# Patient Record
Sex: MALE | Race: WHITE | NOT HISPANIC OR LATINO | Employment: OTHER | URBAN - METROPOLITAN AREA
[De-identification: names, ages, dates, MRNs, and addresses within clinical notes are randomized per-mention and may not be internally consistent; named-entity substitution may affect disease eponyms.]

---

## 2021-08-25 ENCOUNTER — TRANSFERRED RECORDS (OUTPATIENT)
Dept: HEALTH INFORMATION MANAGEMENT | Facility: CLINIC | Age: 51
End: 2021-08-25

## 2021-08-27 ENCOUNTER — TELEPHONE (OUTPATIENT)
Dept: NEUROLOGY | Facility: CLINIC | Age: 51
End: 2021-08-27

## 2021-08-27 NOTE — TELEPHONE ENCOUNTER
Received URGENT referral  Requesting an appointment next week from [facility].    Please contact GERALD Perkins [phone] with appointment information.    DX:  History of seizures and staring spells.

## 2021-08-27 NOTE — TELEPHONE ENCOUNTER
What is the concern that needs to be addressed by a nurse? Care Coordinator called to schedule urgent new pt, call Mag cleary, at [phone] to schedule appt, then call coordinator back to confirm appt. Please schedule intake appts asap.     May a detailed message be left on voicemail? yes    Date of last office visit: NA    Message routed to: sl[facility] new referrals

## 2021-09-01 NOTE — TELEPHONE ENCOUNTER
RECORDS RECEIVED FROM: External   REASON FOR VISIT: seizures   Date of Appt: 9/22/21   NOTES (FOR ALL VISITS) STATUS DETAILS   OFFICE NOTE from referring provider Care Everywhere SEE INPATIENT NOTES   OFFICE NOTE from other specialist Care Everywhere Dr Abimbola Sierra @ [facility]:  3/9/21  9/15/20  3/4/20  9/4/19  (additional encounters in CE)   DISCHARGE SUMMARY from hospital Care Everywhere [facility]:  8/25/21-8/27/21   DISCHARGE REPORT from the ER Care Everywhere [facility]:  10/4/17   OPERATIVE REPORT N/A    MEDICATION LIST Care Everywhere    IMAGING  (FOR ALL VISITS)     EMG N/A    EEG Care Everywhere [facility]:  8/25/21   LUMBAR PUNCTURE N/A    RAKEL SCAN N/A    ULTRASOUND (CAROTID BILAT) *VASCULAR* N/A    MRI (HEAD, NECK, SPINE) Received [facility]:  MRI Head Brain Venogram 3/16/20  MRA Stroke 3/16/20   CT (HEAD, NECK, SPINE) Received [facility]:  CT Head 8/25/21  CT Head 10/4/17      Action 9/1/21 MV 9.21am   Action Taken Imaging request faxed to [facility] for:  MRI Head Brain Venogram 3/16/20  MRA Stroke 3/16/20  CT Head 8/25/21  CT Head 10/4/17    [facility] images resolved in PACS 9/3/21 MV 11.29am    Pt's chart is marked for merge with MRN [id number]. [facility] records are located in that chart in Care Everywhere

## 2021-09-22 ENCOUNTER — PRE VISIT (OUTPATIENT)
Dept: NEUROLOGY | Facility: CLINIC | Age: 51
End: 2021-09-22

## 2021-09-22 ENCOUNTER — OFFICE VISIT (OUTPATIENT)
Dept: NEUROLOGY | Facility: CLINIC | Age: 51
End: 2021-09-22
Payer: COMMERCIAL

## 2021-09-22 VITALS — HEART RATE: 61 BPM | OXYGEN SATURATION: 93 % | DIASTOLIC BLOOD PRESSURE: 73 MMHG | SYSTOLIC BLOOD PRESSURE: 129 MMHG

## 2021-09-22 DIAGNOSIS — G40.909 RECURRENT SEIZURES (H): Primary | ICD-10-CM

## 2021-09-22 PROCEDURE — 99205 OFFICE O/P NEW HI 60 MIN: CPT | Performed by: PSYCHIATRY & NEUROLOGY

## 2021-09-22 RX ORDER — LATANOPROST 50 UG/ML
1 SOLUTION/ DROPS OPHTHALMIC DAILY
COMMUNITY
Start: 2021-03-09 | End: 2024-03-01

## 2021-09-22 RX ORDER — PHENYTOIN SODIUM 100 MG/1
3 CAPSULE, EXTENDED RELEASE ORAL DAILY
COMMUNITY
Start: 2021-09-08 | End: 2022-05-13

## 2021-09-22 RX ORDER — LEVETIRACETAM 1000 MG/1
1000 TABLET ORAL 2 TIMES DAILY
COMMUNITY
Start: 2021-08-27 | End: 2022-05-13

## 2021-09-22 RX ORDER — MULTIVITAMIN
1 TABLET ORAL DAILY
COMMUNITY

## 2021-09-22 RX ORDER — CARBAMAZEPINE 200 MG/1
3 TABLET ORAL 2 TIMES DAILY
COMMUNITY
Start: 2021-09-08 | End: 2022-05-13

## 2021-09-22 RX ORDER — OLOPATADINE HYDROCHLORIDE 1 MG/ML
1-2 SOLUTION/ DROPS OPHTHALMIC 2 TIMES DAILY
COMMUNITY
Start: 2020-03-04

## 2021-09-22 NOTE — LETTER
"2021       RE: Efrain Ellis  [address] [address] [facility] [facility] [address]     Dear Colleague,    Thank you for referring your patient, Efrain Ellis, to the [facility] [place] at [facility]. Please see a copy of my visit note below.    Service Date: 2021    Abimbola Sierra MD  [facility]  [address] [place]  [address]    RE:  Efrain Ellis  MRN:  [id number]  :  1970    Dear Dr. Sierra:    I had the pleasure of seeing your patient, Mr. Efrain Ellis, at [facility] today.  As you know, this patient is a 50-year-old right-handed male with history of developmental delay and longstanding epilepsy.  He was recently admitted to the hospital because of prolonged altered mental status, which was considered to be prolonged seizure.  The patient is accompanied by his mother in the clinic today.    According to the mother, the patient started to have seizures when he was 7 years old.  Over the years, he had fairly infrequent seizures and he has been taking Tegretol and Dilantin for many years and Keppra was recently added after the hospitalization. The patient is currently taking Tegretol 600 mg b.i.d., Dilantin 300 mg at bedtime and Keppra 1000 mg b.i.d.  The mother stated that the patient is more sleepy after Keppra was added about 2 weeks ago but seems the sleepiness is getting better.    The patient was reported to have 2 types of seizures:      Type 1 is \"petit mal seizure.\"  He will have a behavioral arrest.  He will have staring, which usually lasts for about a minute, then he will be back to his baseline.  This was fairly infrequent over the years, probably less than 1 a year as far as the mother can tell.    Type 2 is generalized tonic-clonic seizures.  The patient had infrequent generalized tonic-clonic seizures over the years.  Mother reported that he probably only had 3-4 of " this type of seizure over his lifetime.    TRIGGERS FOR SEIZURES: Probably flashing light, watching too much TV.    RISK FACTORS FOR SEIZURES:  He had no history of head trauma with loss of consciousness.  No history of CNS infection.  No history of febrile convulsions.  He did have history of developmental delay, but he is independent for his ADLs.    CURRENT MEDICATIONS:  Tegretol 300 mg b.i.d., Dilantin 300 mg at bedtime and Keppra 1000 mg b.i.d.      Current Outpatient Medications   Medication Sig Dispense Refill     carBAMazepine (TEGRETOL) 200 MG tablet Take 3 tablets by mouth 2 times daily       carboxymethylcellulose-glycerin (REFRESH OPTIVE) 0.5-0.9 % ophthalmic solution Place 1 drop into both eyes 6 times daily       cholecalciferol 25 MCG (1000 UT) TABS Take 1,000 Units by mouth daily       latanoprost (XALATAN) 0.005 % ophthalmic solution Apply 1 drop to eye daily       levETIRAcetam (KEPPRA) 1000 MG tablet Take 1,000 mg by mouth 2 times daily       olopatadine (PATANOL) 0.1 % ophthalmic solution Place 1-2 drops into both eyes 2 times daily       phenytoin (DILANTIN) 100 MG capsule Take 3 capsules by mouth daily       Specialty Vitamins Products (VITAMINS FOR HAIR) TABS Take 1 tablet by mouth daily       White Petrolatum-Mineral Oil (WH PETROL-MINERAL OIL-LANOLIN) 0.1-0.1 % OINT Apply 1 strip to eye daily         PAST ANTI-SEIZURE MEDICATIONS:  Depakote.    PAST MEDICAL HISTORY:  Kidney tumor, pulmonary valve disorder, status post repair, ventricular septal defect, status post repair.    PAST SURGICAL HISTORY:  Kidney surgery for renal cancer, back surgery for scoliosis and cervical fusion, pulmonary valve replacement, transcatheter PVR, VSD repair/pulmonary valvotomy.      ALLERGIES:  No known drug allergies.    FAMILY HISTORY:  No family history of seizures.    SOCIAL HISTORY:  He had a history of developmental delay.  He had special education in the past.  He is currently living with his mother.  No  alcohol, no smoking, no drug abuse.  He is independent for his ADLs.    REVIEW OF SYSTEMS:  Positive for fatigue.    PHYSICAL EXAMINATION:    Blood pressure 129/73, pulse 61, SpO2 93 %.    General exam: General Appearance:  No acute distress.  HEENT:  Normocephalic, atraumatic.  Neck:  Supple, no lymphadenopathy. Extremities:  No edema, no clubbing, no cyanosis.      Neurologic Exam:  Alert and oriented x3.  Speech fluent, appropriate. Normal attention.  Cranial Nerves:  Pupils are equal, round, reactive to light and accomodation.  Extraocular movement intact.  No facial weakness or asymmetry.  Facial sensation was normal.  Tongue and palate midline.  Hearing normal.  Visual field : normal to confrontation.   Motor Exam:  Normal bulk.  Normal tone.  Strength 5/5 in all extremities.  Sensory:  Normal to light touch and vibration in all extremities.  Deep tendon reflexes absent in both upper and lower extremities.  Coordination:  Finger-to-nose, heel-to-shin exams showed no ataxia.  Rapid alternating movement was normal.  Gait and Station: Difficulties with walking, antalgic gait due to tendinitis. Not able to do tip-toe or heel walking, tandem walking.      PREVIOUS DIAGNOSTIC TESTING:  EEG on 08/26/2021 showed abnormal due to the burst of irregular spike and slow wave activity or sharp and slow waves with a diffuse distribution at times, more prominent over the right hemisphere and at times more prominent over the anterior regions.  Conclusion is that these appear to be epileptogenic.    CT scan of the head on 08/25/2021 was reported negative.    ASSESSMENT AND PLAN:    1.  Epilepsy.  The patient probably has focal impaired seizures and occasional focal to bilateral tonic-clonic seizures.    Mother reported he has infrequent seizures, probably less than 1 seizure per year.  However, he was recently hospitalized because of presumably prolonged seizures or could be focal impaired seizures with prolonged postictal  state.    The patient is currently taking Tegretol 600 mg b.i.d., Dilantin 300 mg at bedtime and Keppra 1000 mg b.i.d.  The patient has some side effects of fatigue, sleepiness.    The patient seems has not had MRI for many years.  I am not sure if he has ever had an MRI scan of the brain. Will have MRI scan of the brain and outpatient EEG for further evaluation, then we will decide how we can adjust his anti-seizure medications.    2.  Developmental delay.    PLAN:    1.  Continue Tegretol 300 mg b.i.d., Dilantin 300 mg at bedtime, Keppra 1000 mg b.i.d.  2.  MRI scan of the brain. Outpatient EEG for 3 hours.  3.  Return to clinic in 6 weeks.  We will adjust his seizure medications based on the MRI and EEG results.      61 min total time was spent on the day of this visit.      37min was spent on face to face time  8 min was spent on preparation of visit to review charts and labs, ordering medications and tests  16 min was spent on documentation of clinical information      Sincerely,    Lana Jarquin MD

## 2021-09-22 NOTE — PATIENT INSTRUCTIONS
Times of Days am pm        Medication Tablet Size Number of Tablets/Capsules Total Daily Dosage    Keppra 1000 1 1       2000 mg    Tegretol 200 3 3       1200 mg    Dilantin 100   3       300 mg                                                                                               Carry this with you at all times.  CONTINUE TAKING YOUR OTHER MEDICATIONS AS PREVIOUSLY DIRECTED.      * * *Do not store medications in the bathroom.  Keep medications away from children!* * *

## 2021-09-22 NOTE — PROGRESS NOTES
"Service Date: 2021    Abimbola Sierra MD  [facility]  [address] [place]  [address]    RE:  Efrain Ellis  MRN:  [id number]  :  1970    Dear Dr. Sierra:    I had the pleasure of seeing your patient, Mr. Efrain Ellis, at [facility] today.  As you know, this patient is a 50-year-old right-handed male with history of developmental delay and longstanding epilepsy.  He was recently admitted to the hospital because of prolonged altered mental status, which was considered to be prolonged seizure.  The patient is accompanied by his mother in the clinic today.    According to the mother, the patient started to have seizures when he was 7 years old.  Over the years, he had fairly infrequent seizures and he has been taking Tegretol and Dilantin for many years and Keppra was recently added after the hospitalization. The patient is currently taking Tegretol 600 mg b.i.d., Dilantin 300 mg at bedtime and Keppra 1000 mg b.i.d.  The mother stated that the patient is more sleepy after Keppra was added about 2 weeks ago but seems the sleepiness is getting better.    The patient was reported to have 2 types of seizures:      Type 1 is \"petit mal seizure.\"  He will have a behavioral arrest.  He will have staring, which usually lasts for about a minute, then he will be back to his baseline.  This was fairly infrequent over the years, probably less than 1 a year as far as the mother can tell.    Type 2 is generalized tonic-clonic seizures.  The patient had infrequent generalized tonic-clonic seizures over the years.  Mother reported that he probably only had 3-4 of this type of seizure over his lifetime.    TRIGGERS FOR SEIZURES: Probably flashing light, watching too much TV.    RISK FACTORS FOR SEIZURES:  He had no history of head trauma with loss of consciousness.  No history of CNS infection.  No history of febrile convulsions.  He did have history of developmental delay, but he " is independent for his ADLs.    CURRENT MEDICATIONS:  Tegretol 300 mg b.i.d., Dilantin 300 mg at bedtime and Keppra 1000 mg b.i.d.      Current Outpatient Medications   Medication Sig Dispense Refill     carBAMazepine (TEGRETOL) 200 MG tablet Take 3 tablets by mouth 2 times daily       carboxymethylcellulose-glycerin (REFRESH OPTIVE) 0.5-0.9 % ophthalmic solution Place 1 drop into both eyes 6 times daily       cholecalciferol 25 MCG (1000 UT) TABS Take 1,000 Units by mouth daily       latanoprost (XALATAN) 0.005 % ophthalmic solution Apply 1 drop to eye daily       levETIRAcetam (KEPPRA) 1000 MG tablet Take 1,000 mg by mouth 2 times daily       olopatadine (PATANOL) 0.1 % ophthalmic solution Place 1-2 drops into both eyes 2 times daily       phenytoin (DILANTIN) 100 MG capsule Take 3 capsules by mouth daily       Specialty Vitamins Products (VITAMINS FOR HAIR) TABS Take 1 tablet by mouth daily       White Petrolatum-Mineral Oil (WH PETROL-MINERAL OIL-LANOLIN) 0.1-0.1 % OINT Apply 1 strip to eye daily         PAST ANTI-SEIZURE MEDICATIONS:  Depakote.    PAST MEDICAL HISTORY:  Kidney tumor, pulmonary valve disorder, status post repair, ventricular septal defect, status post repair.    PAST SURGICAL HISTORY:  Kidney surgery for renal cancer, back surgery for scoliosis and cervical fusion, pulmonary valve replacement, transcatheter PVR, VSD repair/pulmonary valvotomy.      ALLERGIES:  No known drug allergies.    FAMILY HISTORY:  No family history of seizures.    SOCIAL HISTORY:  He had a history of developmental delay.  He had special education in the past.  He is currently living with his mother.  No alcohol, no smoking, no drug abuse.  He is independent for his ADLs.    REVIEW OF SYSTEMS:  Positive for fatigue.    PHYSICAL EXAMINATION:    Blood pressure 129/73, pulse 61, SpO2 93 %.    General exam: General Appearance:  No acute distress.  HEENT:  Normocephalic, atraumatic.  Neck:  Supple, no lymphadenopathy.  Extremities:  No edema, no clubbing, no cyanosis.      Neurologic Exam:  Alert and oriented x3.  Speech fluent, appropriate. Normal attention.  Cranial Nerves:  Pupils are equal, round, reactive to light and accomodation.  Extraocular movement intact.  No facial weakness or asymmetry.  Facial sensation was normal.  Tongue and palate midline.  Hearing normal.  Visual field : normal to confrontation.   Motor Exam:  Normal bulk.  Normal tone.  Strength 5/5 in all extremities.  Sensory:  Normal to light touch and vibration in all extremities.  Deep tendon reflexes absent in both upper and lower extremities.  Coordination:  Finger-to-nose, heel-to-shin exams showed no ataxia.  Rapid alternating movement was normal.  Gait and Station: Difficulties with walking, antalgic gait due to tendinitis. Not able to do tip-toe or heel walking, tandem walking.      PREVIOUS DIAGNOSTIC TESTING:  EEG on 08/26/2021 showed abnormal due to the burst of irregular spike and slow wave activity or sharp and slow waves with a diffuse distribution at times, more prominent over the right hemisphere and at times more prominent over the anterior regions.  Conclusion is that these appear to be epileptogenic.    CT scan of the head on 08/25/2021 was reported negative.    ASSESSMENT AND PLAN:    1.  Epilepsy.  The patient probably has focal impaired seizures and occasional focal to bilateral tonic-clonic seizures.    Mother reported he has infrequent seizures, probably less than 1 seizure per year.  However, he was recently hospitalized because of presumably prolonged seizures or could be focal impaired seizures with prolonged postictal state.    The patient is currently taking Tegretol 600 mg b.i.d., Dilantin 300 mg at bedtime and Keppra 1000 mg b.i.d.  The patient has some side effects of fatigue, sleepiness.    The patient seems has not had MRI for many years.  I am not sure if he has ever had an MRI scan of the brain. Will have MRI scan of the  brain and outpatient EEG for further evaluation, then we will decide how we can adjust his anti-seizure medications.    2.  Developmental delay.    PLAN:    1.  Continue Tegretol 300 mg b.i.d., Dilantin 300 mg at bedtime, Keppra 1000 mg b.i.d.  2.  MRI scan of the brain. Outpatient EEG for 3 hours.  3.  Return to clinic in 6 weeks.  We will adjust his seizure medications based on the MRI and EEG results.      61 min total time was spent on the day of this visit.      37min was spent on face to face time  8 min was spent on preparation of visit to review charts and labs, ordering medications and tests  16 min was spent on documentation of clinical information      Sincerely,    Lana Jarquin MD        D: 2021   T: 2021   MT: christine    Name:     SEVERIANO DILLBrain  MRN:      [id number]        Account:      [id number]   :      1970           Service Date: 2021       Document: [id number]

## 2021-10-21 ENCOUNTER — ANCILLARY PROCEDURE (OUTPATIENT)
Dept: NEUROLOGY | Facility: CLINIC | Age: 51
End: 2021-10-21
Attending: PSYCHIATRY & NEUROLOGY
Payer: COMMERCIAL

## 2021-10-22 DIAGNOSIS — G40.909 RECURRENT SEIZURES (H): Primary | ICD-10-CM

## 2021-10-22 NOTE — TELEPHONE ENCOUNTER
Patient's mother was contacted to discuss her concern about the MRI and patient's claustrophobia. She tells me that she can't tell me much more information about his previous tolerance of MRI but that she doubts he could endure an MRI at this time. She is not aware if he has used oral or IV sedation for imaging in the past.     Will request an order for med from provider. Scan is scheduled for this Sunday.

## 2021-10-22 NOTE — TELEPHONE ENCOUNTER
[facility] Call Center    Phone Message    May a detailed message be left on voicemail: yes     Reason for Call: Other: Patient mother is requesting a call back to discuss patient MRI appoinment and patient being claustrophobic , please call Mag at [phone] to advise.     Action Taken: Message routed to:  [facility] ([facility]    Travel Screening: Not Applicable

## 2021-11-01 RX ORDER — DIAZEPAM 10 MG
10 TABLET ORAL ONCE
Qty: 1 TABLET | Refills: 0 | Status: SHIPPED | OUTPATIENT
Start: 2021-11-01 | End: 2021-11-01

## 2021-11-03 ENCOUNTER — OFFICE VISIT (OUTPATIENT)
Dept: NEUROLOGY | Facility: CLINIC | Age: 51
End: 2021-11-03
Payer: COMMERCIAL

## 2021-11-03 VITALS — OXYGEN SATURATION: 94 % | SYSTOLIC BLOOD PRESSURE: 125 MMHG | HEART RATE: 69 BPM | DIASTOLIC BLOOD PRESSURE: 74 MMHG

## 2021-11-03 DIAGNOSIS — T59.891A ENCEPHALOPATHY DUE TO AMMONIA: Primary | ICD-10-CM

## 2021-11-03 DIAGNOSIS — G92.8 ENCEPHALOPATHY DUE TO AMMONIA: Primary | ICD-10-CM

## 2021-11-03 PROCEDURE — 99214 OFFICE O/P EST MOD 30 MIN: CPT | Performed by: PSYCHIATRY & NEUROLOGY

## 2021-11-03 RX ORDER — LEVOCARNITINE 330 MG/1
330 TABLET ORAL 3 TIMES DAILY
Qty: 90 TABLET | Refills: 6 | Status: SHIPPED | OUTPATIENT
Start: 2021-11-03 | End: 2024-03-01

## 2021-11-03 NOTE — PROGRESS NOTES
"CC: Follow up for seizures.     HPI:  In person follow up.  This patient is a 50-year-old right-handed male with history of developmental delay and longstanding epilepsy.  He was recently admitted to the hospital in Aug. 2021 because of prolonged altered mental status, which was considered to be prolonged seizure.  The patient is accompanied by his mother in the clinic today. Since the last visit, he had no seizures. He was not able to do the MRI because of claustrophobia. He was given a prescription of Valium 10 mg.  Now his MRI is scheduled on 11/16/2021.  is currently taking Tegretol 600 mg b.i.d., Dilantin 300 mg at bedtime and Keppra 1000 mg b.i.d.  The patient has some side effects of fatigue, sleepiness. He is compliant with the meds, complains of some fatigue, sleepiness.    According to the mother, the patient started to have seizures when he was 7 years old.  Over the years, he had fairly infrequent seizures and he has been taking Tegretol and Dilantin for many years and Keppra was recently added after the hospitalization. The patient is currently taking Tegretol 600 mg b.i.d., Dilantin 300 mg at bedtime and Keppra 1000 mg b.i.d.  The mother stated that the patient is more sleepy after Keppra was added about 2 weeks ago but seems the sleepiness is getting better.    The patient was reported to have 2 types of seizures:      Type 1 is \"petit mal seizure.\"  He will have a behavioral arrest.  He will have staring, which usually lasts for about a minute, then he will be back to his baseline.  This was fairly infrequent over the years, probably less than 1 a year as far as the mother can tell.    Type 2 is generalized tonic-clonic seizures.  The patient had infrequent generalized tonic-clonic seizures over the years.  Mother reported that he probably only had 3-4 of this type of seizure over his lifetime.    TRIGGERS FOR SEIZURES: Probably flashing light, watching too much TV.    RISK FACTORS FOR SEIZURES:  He " had no history of head trauma with loss of consciousness.  No history of CNS infection.  No history of febrile convulsions.  He did have history of developmental delay, but he is independent for his ADLs.    CURRENT MEDICATIONS:  Tegretol 300 mg b.i.d., Dilantin 300 mg at bedtime and Keppra 1000 mg b.i.d.      Current Outpatient Medications   Medication Sig Dispense Refill     carBAMazepine (TEGRETOL) 200 MG tablet Take 3 tablets by mouth 2 times daily       carboxymethylcellulose-glycerin (REFRESH OPTIVE) 0.5-0.9 % ophthalmic solution Place 1 drop into both eyes 6 times daily       cholecalciferol 25 MCG (1000 UT) TABS Take 1,000 Units by mouth daily       latanoprost (XALATAN) 0.005 % ophthalmic solution Apply 1 drop to eye daily       levETIRAcetam (KEPPRA) 1000 MG tablet Take 1,000 mg by mouth 2 times daily       olopatadine (PATANOL) 0.1 % ophthalmic solution Place 1-2 drops into both eyes 2 times daily       phenytoin (DILANTIN) 100 MG capsule Take 3 capsules by mouth daily       Specialty Vitamins Products (VITAMINS FOR HAIR) TABS Take 1 tablet by mouth daily       White Petrolatum-Mineral Oil (WH PETROL-MINERAL OIL-LANOLIN) 0.1-0.1 % OINT Apply 1 strip to eye daily         PAST ANTI-SEIZURE MEDICATIONS:  Depakote.    PAST MEDICAL HISTORY:  Kidney tumor, pulmonary valve disorder, status post repair, ventricular septal defect, status post repair.    PAST SURGICAL HISTORY:  Kidney surgery for renal cancer, back surgery for scoliosis and cervical fusion, pulmonary valve replacement, transcatheter PVR, VSD repair/pulmonary valvotomy.      ALLERGIES:  No known drug allergies.    FAMILY HISTORY:  No family history of seizures.    SOCIAL HISTORY:  He had a history of developmental delay.  He had special education in the past.  He is currently living with his mother.  No alcohol, no smoking, no drug abuse.  He is independent for his ADLs.    REVIEW OF SYSTEMS:  Positive for fatigue.    PHYSICAL EXAMINATION:    Blood  pressure 125/74, pulse 69, SpO2 94 %.    General exam: General Appearance: No acute distress. HEENT: Normocephalic, atraumatic. Neck: Supple.  Extremities: No edema, no clubbing, no cyanosis.     Neurologic Exam: Alert and oriented x3. Speech fluent, appropriate. Normal attention. Cranial Nerves: Pupils are equal, round, reactive to light and accomodation. Extraocular movement intact. No facial weakness or asymmetry. Hearing normal. Motor Exam: Normal. Coordination:no ataxia.  Gait and Station: normal.    PREVIOUS DIAGNOSTIC TESTING:  EEG on 08/26/2021 showed abnormal due to the burst of irregular spike and slow wave activity or sharp and slow waves with a diffuse distribution at times, more prominent over the right hemisphere and at times more prominent over the anterior regions.  Conclusion is that these appear to be epileptogenic.    CT scan of the head on 08/25/2021 was reported negative.    ASSESSMENT AND PLAN:    1.  Epilepsy.  The patient probably has focal impaired seizures and occasional focal to bilateral tonic-clonic seizures.    Since the last visit, he had no seizures. He was not able to do the MRI because of claustrophobia. He was given a prescription of Valium 10 mg.  Now his MRI is scheduled on 11/16/2021.  is currently taking Tegretol 600 mg b.i.d., Dilantin 300 mg at bedtime and Keppra 1000 mg b.i.d.  The patient has some side effects of fatigue, sleepiness. He is compliant with the meds, complains of some fatigue, sleepiness.    2.  Developmental delay.    PLAN:    1.  Continue Tegretol 300 mg b.i.d., Dilantin 300 mg at bedtime, Keppra 1000 mg b.i.d.  2.  MRI scan of the brain. Valium 10 mg 30 min before MRI. Prescription given.  3.  Return to clinic in 3 months.  We will adjust his seizure medications based on the MRI and EEG results.      37 min total time was spent on the day of this visit.      25 min was spent on face to face time  7 min was spent on preparation of visit to review charts and  labs, ordering medications and tests  5 min was spent on documentation of clinical information

## 2021-11-03 NOTE — LETTER
"11/3/2021       RE: Efrain Ellis  [id number] [facility] [address]     Dear Colleague,    Thank you for referring your patient, Efrain Ellis, to the [facility] NEUROLOGY CLINIC [place] at [facility]. Please see a copy of my visit note below.    CC: Follow up for seizures.     HPI:  In person follow up.  This patient is a 50-year-old right-handed male with history of developmental delay and longstanding epilepsy.  He was recently admitted to the hospital in Aug. 2021 because of prolonged altered mental status, which was considered to be prolonged seizure.  The patient is accompanied by his mother in the clinic today. Since the last visit, he had no seizures. He was not able to do the MRI because of claustrophobia. He was given a prescription of Valium 10 mg.  Now his MRI is scheduled on 11/16/2021.  is currently taking Tegretol 600 mg b.i.d., Dilantin 300 mg at bedtime and Keppra 1000 mg b.i.d.  The patient has some side effects of fatigue, sleepiness. He is compliant with the meds, complains of some fatigue, sleepiness.    According to the mother, the patient started to have seizures when he was 7 years old.  Over the years, he had fairly infrequent seizures and he has been taking Tegretol and Dilantin for many years and Keppra was recently added after the hospitalization. The patient is currently taking Tegretol 600 mg b.i.d., Dilantin 300 mg at bedtime and Keppra 1000 mg b.i.d.  The mother stated that the patient is more sleepy after Keppra was added about 2 weeks ago but seems the sleepiness is getting better.    The patient was reported to have 2 types of seizures:      Type 1 is \"petit mal seizure.\"  He will have a behavioral arrest.  He will have staring, which usually lasts for about a minute, then he will be back to his baseline.  This was fairly infrequent over the years, probably less than 1 a year as far as the mother can " tell.    Type 2 is generalized tonic-clonic seizures.  The patient had infrequent generalized tonic-clonic seizures over the years.  Mother reported that he probably only had 3-4 of this type of seizure over his lifetime.    TRIGGERS FOR SEIZURES: Probably flashing light, watching too much TV.    RISK FACTORS FOR SEIZURES:  He had no history of head trauma with loss of consciousness.  No history of CNS infection.  No history of febrile convulsions.  He did have history of developmental delay, but he is independent for his ADLs.    CURRENT MEDICATIONS:  Tegretol 300 mg b.i.d., Dilantin 300 mg at bedtime and Keppra 1000 mg b.i.d.      Current Outpatient Medications   Medication Sig Dispense Refill     carBAMazepine (TEGRETOL) 200 MG tablet Take 3 tablets by mouth 2 times daily       carboxymethylcellulose-glycerin (REFRESH OPTIVE) 0.5-0.9 % ophthalmic solution Place 1 drop into both eyes 6 times daily       cholecalciferol 25 MCG (1000 UT) TABS Take 1,000 Units by mouth daily       latanoprost (XALATAN) 0.005 % ophthalmic solution Apply 1 drop to eye daily       levETIRAcetam (KEPPRA) 1000 MG tablet Take 1,000 mg by mouth 2 times daily       olopatadine (PATANOL) 0.1 % ophthalmic solution Place 1-2 drops into both eyes 2 times daily       phenytoin (DILANTIN) 100 MG capsule Take 3 capsules by mouth daily       Specialty Vitamins Products (VITAMINS FOR HAIR) TABS Take 1 tablet by mouth daily       White Petrolatum-Mineral Oil (WH PETROL-MINERAL OIL-LANOLIN) 0.1-0.1 % OINT Apply 1 strip to eye daily         PAST ANTI-SEIZURE MEDICATIONS:  Depakote.    PAST MEDICAL HISTORY:  Kidney tumor, pulmonary valve disorder, status post repair, ventricular septal defect, status post repair.    PAST SURGICAL HISTORY:  Kidney surgery for renal cancer, back surgery for scoliosis and cervical fusion, pulmonary valve replacement, transcatheter PVR, VSD repair/pulmonary valvotomy.      ALLERGIES:  No known drug allergies.    FAMILY  HISTORY:  No family history of seizures.    SOCIAL HISTORY:  He had a history of developmental delay.  He had special education in the past.  He is currently living with his mother.  No alcohol, no smoking, no drug abuse.  He is independent for his ADLs.    REVIEW OF SYSTEMS:  Positive for fatigue.    PHYSICAL EXAMINATION:    Blood pressure 125/74, pulse 69, SpO2 94 %.    General exam: General Appearance: No acute distress. HEENT: Normocephalic, atraumatic. Neck: Supple.  Extremities: No edema, no clubbing, no cyanosis.     Neurologic Exam: Alert and oriented x3. Speech fluent, appropriate. Normal attention. Cranial Nerves: Pupils are equal, round, reactive to light and accomodation. Extraocular movement intact. No facial weakness or asymmetry. Hearing normal. Motor Exam: Normal. Coordination:no ataxia.  Gait and Station: normal.    PREVIOUS DIAGNOSTIC TESTING:  EEG on 08/26/2021 showed abnormal due to the burst of irregular spike and slow wave activity or sharp and slow waves with a diffuse distribution at times, more prominent over the right hemisphere and at times more prominent over the anterior regions.  Conclusion is that these appear to be epileptogenic.    CT scan of the head on 08/25/2021 was reported negative.    ASSESSMENT AND PLAN:    1.  Epilepsy.  The patient probably has focal impaired seizures and occasional focal to bilateral tonic-clonic seizures.    Since the last visit, he had no seizures. He was not able to do the MRI because of claustrophobia. He was given a prescription of Valium 10 mg.  Now his MRI is scheduled on 11/16/2021.  is currently taking Tegretol 600 mg b.i.d., Dilantin 300 mg at bedtime and Keppra 1000 mg b.i.d.  The patient has some side effects of fatigue, sleepiness. He is compliant with the meds, complains of some fatigue, sleepiness.    2.  Developmental delay.    PLAN:    1.  Continue Tegretol 300 mg b.i.d., Dilantin 300 mg at bedtime, Keppra 1000 mg b.i.d.  2.  MRI scan of  the brain. Valium 10 mg 30 min before MRI. Prescription given.  3.  Return to clinic in 3 months.  We will adjust his seizure medications based on the MRI and EEG results.      37 min total time was spent on the day of this visit.      25 min was spent on face to face time  7 min was spent on preparation of visit to review charts and labs, ordering medications and tests  5 min was spent on documentation of clinical information      Again, thank you for allowing me to participate in the care of your patient.      Sincerely,    Lana Jarquin MD

## 2021-11-16 ENCOUNTER — ANCILLARY PROCEDURE (OUTPATIENT)
Dept: MRI IMAGING | Facility: CLINIC | Age: 51
End: 2021-11-16
Attending: PSYCHIATRY & NEUROLOGY
Payer: COMMERCIAL

## 2021-11-16 PROCEDURE — 70553 MRI BRAIN STEM W/O & W/DYE: CPT | Performed by: RADIOLOGY

## 2021-11-16 PROCEDURE — A9585 GADOBUTROL INJECTION: HCPCS | Performed by: RADIOLOGY

## 2021-11-16 RX ORDER — GADOBUTROL 604.72 MG/ML
15 INJECTION INTRAVENOUS ONCE
Status: COMPLETED | OUTPATIENT
Start: 2021-11-16 | End: 2021-11-16

## 2021-11-16 RX ADMIN — GADOBUTROL 12 ML: 604.72 INJECTION INTRAVENOUS at 09:31

## 2021-11-16 NOTE — DISCHARGE INSTRUCTIONS
MRI Contrast Discharge Instructions    The IV contrast you received today will pass out of your body in your  urine. This will happen in the next 24 hours. You will not feel this process.  Your urine will not change color.    Drink at least 4 extra glasses of water or juice today (unless your doctor  has restricted your fluids). This reduces the stress on your kidneys.  You may take your regular medicines.    If you are on dialysis: It is best to have dialysis today.    If you have a reaction: Most reactions happen right away. If you have  any new symptoms after leaving the hospital (such as hives or swelling),  call your hospital at the correct number below. Or call your family doctor.  If you have breathing distress or wheezing, call 911.    Special instructions: ***    I have read and understand the above information.    Signature:______________________________________ Date:___________    Staff:__________________________________________ Date:___________     Time:__________    [place] [facility] Departments:    ___[place]: [phone]  ___[facility]: [phone]  ___[place]: [phone] ___[facility]: [phone]  ___[facility]: [phone]  ___[facility]: [phone]  ___[phone]  ___[facility]: [phone]  ___[phone]

## 2022-02-04 ENCOUNTER — OFFICE VISIT (OUTPATIENT)
Dept: NEUROLOGY | Facility: CLINIC | Age: 52
End: 2022-02-04
Payer: COMMERCIAL

## 2022-02-04 ENCOUNTER — LAB (OUTPATIENT)
Dept: LAB | Facility: CLINIC | Age: 52
End: 2022-02-04
Attending: PSYCHIATRY & NEUROLOGY

## 2022-02-04 VITALS — OXYGEN SATURATION: 97 % | SYSTOLIC BLOOD PRESSURE: 131 MMHG | DIASTOLIC BLOOD PRESSURE: 72 MMHG | HEART RATE: 59 BPM

## 2022-02-04 DIAGNOSIS — G40.909 RECURRENT SEIZURES (H): ICD-10-CM

## 2022-02-04 DIAGNOSIS — G40.909 RECURRENT SEIZURES (H): Primary | ICD-10-CM

## 2022-02-04 LAB — PHENYTOIN SERPL-MCNC: 18.5 MG/L

## 2022-02-04 PROCEDURE — 99214 OFFICE O/P EST MOD 30 MIN: CPT | Performed by: PSYCHIATRY & NEUROLOGY

## 2022-02-04 PROCEDURE — 80177 DRUG SCRN QUAN LEVETIRACETAM: CPT | Mod: 90 | Performed by: PATHOLOGY

## 2022-02-04 PROCEDURE — 80161 ASY CARBAMAZEPIN 10,11-EPXID: CPT | Mod: 90 | Performed by: PATHOLOGY

## 2022-02-04 PROCEDURE — 80156 ASSAY CARBAMAZEPINE TOTAL: CPT | Mod: 90 | Performed by: PATHOLOGY

## 2022-02-04 PROCEDURE — 80157 ASSAY CARBAMAZEPINE FREE: CPT | Mod: 90 | Performed by: PATHOLOGY

## 2022-02-04 PROCEDURE — 99000 SPECIMEN HANDLING OFFICE-LAB: CPT | Performed by: PATHOLOGY

## 2022-02-04 PROCEDURE — 80185 ASSAY OF PHENYTOIN TOTAL: CPT | Mod: 90 | Performed by: PATHOLOGY

## 2022-02-04 PROCEDURE — 80186 ASSAY OF PHENYTOIN FREE: CPT | Mod: 90 | Performed by: PATHOLOGY

## 2022-02-04 PROCEDURE — 36415 COLL VENOUS BLD VENIPUNCTURE: CPT | Performed by: PATHOLOGY

## 2022-02-04 RX ORDER — CLOBETASOL PROPIONATE 0.5 MG/G
1 CREAM TOPICAL 2 TIMES DAILY
COMMUNITY
Start: 2022-01-05

## 2022-02-04 NOTE — PROGRESS NOTES
"CC: Follow up for seizures.     HPI:  In person follow up.  This patient is a 51-year-old right-handed male with history of developmental delay and longstanding epilepsy. He is accompanied by his mother in the clinic today. Since the last visit, he had no seizures. His last seizure was in Aug. 2021, when he was admitted to the hospital. He had MRi in Nov. 2021, which was normal. He is currently taking Tegretol 600 mg b.i.d., Dilantin 360 mg at bedtime and Keppra 1000 mg b.i.d.  The patient has some side effects of fatigue, sleepiness. Motehr is wondering if he need this much meds, can he take less meds? He is compliant with the meds, complains of some fatigue, sleepiness.    He was recently admitted to the hospital in Aug. 2021 because of prolonged altered mental status, which was considered to be prolonged seizure.      According to the mother, the patient started to have seizures when he was 7 years old.  Over the years, he had fairly infrequent seizures and he has been taking Tegretol and Dilantin for many years and Keppra was recently added after the hospitalization. The patient is currently taking Tegretol 600 mg b.i.d., Dilantin 300 mg at bedtime and Keppra 1000 mg b.i.d.  The mother stated that the patient is more sleepy after Keppra was added about 2 weeks ago but seems the sleepiness is getting better.    The patient was reported to have 2 types of seizures:      Type 1 is \"petit mal seizure.\"  He will have a behavioral arrest.  He will have staring, which usually lasts for about a minute, then he will be back to his baseline.  This was fairly infrequent over the years, probably less than 1 a year as far as the mother can tell.    Type 2 is generalized tonic-clonic seizures.  The patient had infrequent generalized tonic-clonic seizures over the years.  Mother reported that he probably only had 3-4 of this type of seizure over his lifetime.    TRIGGERS FOR SEIZURES: Probably flashing light, watching too much " TV.    RISK FACTORS FOR SEIZURES:  He had no history of head trauma with loss of consciousness.  No history of CNS infection.  No history of febrile convulsions.  He did have history of developmental delay, but he is independent for his ADLs.    CURRENT MEDICATIONS:  Tegretol 600 mg b.i.d., Dilantin 360 mg at bedtime and Keppra 1000 mg b.i.d.      Current Outpatient Medications   Medication Sig Dispense Refill     carBAMazepine (TEGRETOL) 200 MG tablet Take 3 tablets by mouth 2 times daily       carboxymethylcellulose-glycerin (REFRESH OPTIVE) 0.5-0.9 % ophthalmic solution Place 1 drop into both eyes 6 times daily       cholecalciferol 25 MCG (1000 UT) TABS Take 1,000 Units by mouth daily       clobetasol (TEMOVATE) 0.05 % external cream Apply 1 Application topically 2 times daily       latanoprost (XALATAN) 0.005 % ophthalmic solution Apply 1 drop to eye daily       levETIRAcetam (KEPPRA) 1000 MG tablet Take 1,000 mg by mouth 2 times daily       levOCARNitine (CARNITOR) 330 MG tablet Take 1 tablet (330 mg) by mouth 3 times daily 90 tablet 6     olopatadine (PATANOL) 0.1 % ophthalmic solution Place 1-2 drops into both eyes 2 times daily       phenytoin (DILANTIN) 100 MG capsule Take 3 capsules by mouth daily       Specialty Vitamins Products (VITAMINS FOR HAIR) TABS Take 1 tablet by mouth daily       White Petrolatum-Mineral Oil (WH PETROL-MINERAL OIL-LANOLIN) 0.1-0.1 % OINT Apply 1 strip to eye daily         PAST ANTI-SEIZURE MEDICATIONS:  Depakote.    PAST MEDICAL HISTORY:  Kidney tumor, pulmonary valve disorder, status post repair, ventricular septal defect, status post repair.    PAST SURGICAL HISTORY:  Kidney surgery for renal cancer, back surgery for scoliosis and cervical fusion, pulmonary valve replacement, transcatheter PVR, VSD repair/pulmonary valvotomy.      ALLERGIES:  No known drug allergies.    FAMILY HISTORY:  No family history of seizures.    SOCIAL HISTORY:  He had a history of developmental delay.   He had special education in the past.  He is currently living with his mother.  No alcohol, no smoking, no drug abuse.  He is independent for his ADLs.    REVIEW OF SYSTEMS:  Positive for fatigue.    PHYSICAL EXAMINATION:    Blood pressure 131/72, pulse 59, SpO2 97 %.    General exam: General Appearance: No acute distress. HEENT: Normocephalic, atraumatic. Neck: Supple.  Extremities: No edema, no clubbing, no cyanosis.     Neurologic Exam: Alert and oriented x3. Speech fluent, appropriate. Normal attention. Cranial Nerves: Pupils are equal, round, reactive to light and accomodation. Extraocular movement intact. No facial weakness or asymmetry. Hearing normal. Motor Exam: Normal. Coordination:no ataxia.  Gait and Station: normal.    PREVIOUS DIAGNOSTIC TESTING:  EEG on 08/26/2021 showed abnormal due to the burst of irregular spike and slow wave activity or sharp and slow waves with a diffuse distribution at times, more prominent over the right hemisphere and at times more prominent over the anterior regions.  Conclusion is that these appear to be epileptogenic.    CT scan of the head on 08/25/2021 was reported negative.    ASSESSMENT AND PLAN:    1.  Epilepsy.  The patient probably has focal impaired seizures and occasional focal to bilateral tonic-clonic seizures.    Since the last visit, he had no seizures. His last seizure was in Aug. 2021, when he was admitted to the hospital. He had MRi in Nov. 2021, which was normal. He is currently taking Tegretol 600 mg b.i.d., Dilantin 360 mg at bedtime and Keppra 1000 mg b.i.d.  The patient has some side effects of fatigue, sleepiness. Collin is wondering if he need this much meds.    2.  Developmental delay.    PLAN:    1.  Continue Tegretol 600 mg b.i.d., Dilantin 360 mg at bedtime, Keppra 1000 mg b.i.d.  2.  Patient and his mother were advised to bring the pill bottles during the next visit.   3.  Return to clinic in 3 months.  We will adjust his meds based on his lab.   Patient was advised to have more exercise and eat healthy. Goal is to lose 10 lb for the next 3 months.      32 min total time was spent on the day of this visit.      22 min was spent on face to face time  5 min was spent on preparation of visit to review charts and labs, ordering medications and tests  5 min was spent on documentation of clinical information

## 2022-02-04 NOTE — LETTER
"2/4/2022       RE: Efrain Ellis  [address] [facility] [address]     Dear Colleague,    Thank you for referring your patient, Efrain Ellis, to the [facility] NEUROLOGY CLINIC [place] at [facility]. Please see a copy of my visit note below.    CC: Follow up for seizures.     HPI:  In person follow up.  This patient is a 51-year-old right-handed male with history of developmental delay and longstanding epilepsy. He is accompanied by his mother in the clinic today. Since the last visit, he had no seizures. His last seizure was in Aug. 2021, when he was admitted to the hospital. He had MRi in Nov. 2021, which was normal. He is currently taking Tegretol 600 mg b.i.d., Dilantin 360 mg at bedtime and Keppra 1000 mg b.i.d.  The patient has some side effects of fatigue, sleepiness. Motehr is wondering if he need this much meds, can he take less meds? He is compliant with the meds, complains of some fatigue, sleepiness.    He was recently admitted to the hospital in Aug. 2021 because of prolonged altered mental status, which was considered to be prolonged seizure.      According to the mother, the patient started to have seizures when he was 7 years old.  Over the years, he had fairly infrequent seizures and he has been taking Tegretol and Dilantin for many years and Keppra was recently added after the hospitalization. The patient is currently taking Tegretol 600 mg b.i.d., Dilantin 300 mg at bedtime and Keppra 1000 mg b.i.d.  The mother stated that the patient is more sleepy after Keppra was added about 2 weeks ago but seems the sleepiness is getting better.    The patient was reported to have 2 types of seizures:      Type 1 is \"petit mal seizure.\"  He will have a behavioral arrest.  He will have staring, which usually lasts for about a minute, then he will be back to his baseline.  This was fairly infrequent over the years, probably less than 1 " a year as far as the mother can tell.    Type 2 is generalized tonic-clonic seizures.  The patient had infrequent generalized tonic-clonic seizures over the years.  Mother reported that he probably only had 3-4 of this type of seizure over his lifetime.    TRIGGERS FOR SEIZURES: Probably flashing light, watching too much TV.    RISK FACTORS FOR SEIZURES:  He had no history of head trauma with loss of consciousness.  No history of CNS infection.  No history of febrile convulsions.  He did have history of developmental delay, but he is independent for his ADLs.    CURRENT MEDICATIONS:  Tegretol 600 mg b.i.d., Dilantin 360 mg at bedtime and Keppra 1000 mg b.i.d.      Current Outpatient Medications   Medication Sig Dispense Refill     carBAMazepine (TEGRETOL) 200 MG tablet Take 3 tablets by mouth 2 times daily       carboxymethylcellulose-glycerin (REFRESH OPTIVE) 0.5-0.9 % ophthalmic solution Place 1 drop into both eyes 6 times daily       cholecalciferol 25 MCG (1000 UT) TABS Take 1,000 Units by mouth daily       clobetasol (TEMOVATE) 0.05 % external cream Apply 1 Application topically 2 times daily       latanoprost (XALATAN) 0.005 % ophthalmic solution Apply 1 drop to eye daily       levETIRAcetam (KEPPRA) 1000 MG tablet Take 1,000 mg by mouth 2 times daily       levOCARNitine (CARNITOR) 330 MG tablet Take 1 tablet (330 mg) by mouth 3 times daily 90 tablet 6     olopatadine (PATANOL) 0.1 % ophthalmic solution Place 1-2 drops into both eyes 2 times daily       phenytoin (DILANTIN) 100 MG capsule Take 3 capsules by mouth daily       Specialty Vitamins Products (VITAMINS FOR HAIR) TABS Take 1 tablet by mouth daily       White Petrolatum-Mineral Oil (WH PETROL-MINERAL OIL-LANOLIN) 0.1-0.1 % OINT Apply 1 strip to eye daily         PAST ANTI-SEIZURE MEDICATIONS:  Depakote.    PAST MEDICAL HISTORY:  Kidney tumor, pulmonary valve disorder, status post repair, ventricular septal defect, status post repair.    PAST SURGICAL  HISTORY:  Kidney surgery for renal cancer, back surgery for scoliosis and cervical fusion, pulmonary valve replacement, transcatheter PVR, VSD repair/pulmonary valvotomy.      ALLERGIES:  No known drug allergies.    FAMILY HISTORY:  No family history of seizures.    SOCIAL HISTORY:  He had a history of developmental delay.  He had special education in the past.  He is currently living with his mother.  No alcohol, no smoking, no drug abuse.  He is independent for his ADLs.    REVIEW OF SYSTEMS:  Positive for fatigue.    PHYSICAL EXAMINATION:    Blood pressure 131/72, pulse 59, SpO2 97 %.    General exam: General Appearance: No acute distress. HEENT: Normocephalic, atraumatic. Neck: Supple.  Extremities: No edema, no clubbing, no cyanosis.     Neurologic Exam: Alert and oriented x3. Speech fluent, appropriate. Normal attention. Cranial Nerves: Pupils are equal, round, reactive to light and accomodation. Extraocular movement intact. No facial weakness or asymmetry. Hearing normal. Motor Exam: Normal. Coordination:no ataxia.  Gait and Station: normal.    PREVIOUS DIAGNOSTIC TESTING:  EEG on 08/26/2021 showed abnormal due to the burst of irregular spike and slow wave activity or sharp and slow waves with a diffuse distribution at times, more prominent over the right hemisphere and at times more prominent over the anterior regions.  Conclusion is that these appear to be epileptogenic.    CT scan of the head on 08/25/2021 was reported negative.    ASSESSMENT AND PLAN:    1.  Epilepsy.  The patient probably has focal impaired seizures and occasional focal to bilateral tonic-clonic seizures.    Since the last visit, he had no seizures. His last seizure was in Aug. 2021, when he was admitted to the hospital. He had MRi in Nov. 2021, which was normal. He is currently taking Tegretol 600 mg b.i.d., Dilantin 360 mg at bedtime and Keppra 1000 mg b.i.d.  The patient has some side effects of fatigue, sleepiness. Motehr is  wondering if he need this much meds.    2.  Developmental delay.    PLAN:    1.  Continue Tegretol 600 mg b.i.d., Dilantin 360 mg at bedtime, Keppra 1000 mg b.i.d.  2.  Patient and his mother were advised to bring the pill bottles during the next visit.   3.  Return to clinic in 3 months.  We will adjust his meds based on his lab.  Patient was advised to have more exercise and eat healthy. Goal is to lose 10 lb for the next 3 months.      32 min total time was spent on the day of this visit.      22 min was spent on face to face time  5 min was spent on preparation of visit to review charts and labs, ordering medications and tests  5 min was spent on documentation of clinical information      Again, thank you for allowing me to participate in the care of your patient.      Sincerely,    Lana Jarquin MD

## 2022-02-04 NOTE — PATIENT INSTRUCTIONS
Times of Days am pm        Medication Tablet Size Number of Tablets/Capsules Total Daily Dosage    Keppra 1000 1 1       2000 mg    Tegretol 200 3 3       1200 mg    Dilantin 100   3       300 mg    Dilantin 30  2       60 mg                                                                             Carry this with you at all times.  CONTINUE TAKING YOUR OTHER MEDICATIONS AS PREVIOUSLY DIRECTED.      * * *Do not store medications in the bathroom.  Keep medications away from children!* * *

## 2022-02-08 LAB — PHENYTOIN FREE SERPL-MCNC: 1.46 UG/ML

## 2022-02-09 LAB — LEVETIRACETAM SERPL-MCNC: 19 UG/ML

## 2022-02-10 LAB
CARB EPOXIDE, UNBOUND: 0.8 UG/ML
CARBAMAZEPINE EP SERPL-MCNC: 1.7 UG/ML
CARBAMAZEPINE SERPL-MCNC: 7.7 UG/ML
CARBAMAZEPINE, UNBOUND: 2 UG/ML

## 2022-05-13 ENCOUNTER — OFFICE VISIT (OUTPATIENT)
Dept: NEUROLOGY | Facility: CLINIC | Age: 52
End: 2022-05-13
Payer: COMMERCIAL

## 2022-05-13 VITALS — OXYGEN SATURATION: 93 % | DIASTOLIC BLOOD PRESSURE: 83 MMHG | HEART RATE: 66 BPM | SYSTOLIC BLOOD PRESSURE: 130 MMHG

## 2022-05-13 DIAGNOSIS — G40.909 RECURRENT SEIZURES (H): Primary | ICD-10-CM

## 2022-05-13 PROCEDURE — 99214 OFFICE O/P EST MOD 30 MIN: CPT | Performed by: PSYCHIATRY & NEUROLOGY

## 2022-05-13 RX ORDER — CARBAMAZEPINE 200 MG/1
600 TABLET ORAL 2 TIMES DAILY
Qty: 540 TABLET | Refills: 3 | Status: SHIPPED | OUTPATIENT
Start: 2022-05-13 | End: 2023-05-24

## 2022-05-13 RX ORDER — LEVETIRACETAM 1000 MG/1
1000 TABLET ORAL 2 TIMES DAILY
Qty: 180 TABLET | Refills: 3 | Status: SHIPPED | OUTPATIENT
Start: 2022-05-13 | End: 2023-05-22

## 2022-05-13 RX ORDER — EXTENDED PHENYTOIN SODIUM 30 MG/1
30 CAPSULE ORAL AT BEDTIME
COMMUNITY
Start: 2022-04-11 | End: 2022-05-13

## 2022-05-13 RX ORDER — EXTENDED PHENYTOIN SODIUM 30 MG/1
60 CAPSULE ORAL AT BEDTIME
Qty: 180 CAPSULE | Refills: 3 | Status: SHIPPED | OUTPATIENT
Start: 2022-05-13 | End: 2023-05-22

## 2022-05-13 RX ORDER — PHENYTOIN SODIUM 100 MG/1
300 CAPSULE, EXTENDED RELEASE ORAL DAILY
Qty: 270 CAPSULE | Refills: 3 | Status: SHIPPED | OUTPATIENT
Start: 2022-05-13 | End: 2023-05-24

## 2022-05-13 ASSESSMENT — PAIN SCALES - GENERAL: PAINLEVEL: NO PAIN (0)

## 2022-05-13 NOTE — LETTER
5/13/2022       RE: Efrain Ellis  [id number] [facility] [address]     Dear Colleague,    Thank you for referring your patient, Efrain Ellis, to the [facility] [place] at [facility]. Please see a copy of my visit note below.    CC: Follow up for seizures.     HPI:  In person follow up.  This patient is a 51-year-old right-handed male with history of developmental delay and longstanding epilepsy. He is accompanied by his mother in the clinic today. Since the last visit, he had no seizures. His last seizure was in Aug. 2021, when he was admitted to the hospital. He missed some dilantin dose at that time (He was supposed to take Dilantin 360 mg at bedtime, but he probably took Dilantin 330 mg at bedtime for about 3 days). Keppra was added after he was admitted to the hospital. He had MRI in Nov. 2021, which was normal. He is currently taking Tegretol 600 mg b.i.d., Dilantin 360 mg at bedtime and Keppra 1000 mg b.i.d.  The patient has some side effects of fatigue, sleepiness. Mother is wondering if he needs this much meds, can he take less meds? He is compliant with the meds, complains of some fatigue, sleepiness.    He was recently admitted to the hospital in Aug. 2021 because of prolonged altered mental status, which was considered to be prolonged seizure.      According to the mother, the patient started to have seizures when he was 7 years old.  Over the years, he had fairly infrequent seizures and he has been taking Tegretol and Dilantin for many years and Keppra was recently added after the hospitalization. The patient is currently taking Tegretol 600 mg b.i.d., Dilantin 300 mg at bedtime and Keppra 1000 mg b.i.d.  The mother stated that the patient is more sleepy after Keppra was added about 2 weeks ago but seems the sleepiness is getting better.    The patient was reported to have 2 types of seizures:      Type 1 is  "\"petit mal seizure.\"  He will have a behavioral arrest.  He will have staring, which usually lasts for about a minute, then he will be back to his baseline.  This was fairly infrequent over the years, probably less than 1 a year as far as the mother can tell.    Type 2 is generalized tonic-clonic seizures.  The patient had infrequent generalized tonic-clonic seizures over the years.  Mother reported that he probably only had 3-4 of this type of seizure over his lifetime.    TRIGGERS FOR SEIZURES: Probably flashing light, watching too much TV.    RISK FACTORS FOR SEIZURES:  He had no history of head trauma with loss of consciousness.  No history of CNS infection.  No history of febrile convulsions.  He did have history of developmental delay, but he is independent for his ADLs.    CURRENT MEDICATIONS:  Tegretol 600 mg b.i.d., Dilantin 360 mg at bedtime and Keppra 1000 mg b.i.d.      Current Outpatient Medications   Medication Sig Dispense Refill     carBAMazepine (TEGRETOL) 200 MG tablet Take 3 tablets by mouth 2 times daily       carboxymethylcellulose-glycerin (REFRESH OPTIVE) 0.5-0.9 % ophthalmic solution Place 1 drop into both eyes 6 times daily       cholecalciferol 25 MCG (1000 UT) TABS Take 1,000 Units by mouth daily       clobetasol (TEMOVATE) 0.05 % external cream Apply 1 Application topically 2 times daily       latanoprost (XALATAN) 0.005 % ophthalmic solution Apply 1 drop to eye daily       levETIRAcetam (KEPPRA) 1000 MG tablet Take 1,000 mg by mouth 2 times daily       levOCARNitine (CARNITOR) 330 MG tablet Take 1 tablet (330 mg) by mouth 3 times daily 90 tablet 6     olopatadine (PATANOL) 0.1 % ophthalmic solution Place 1-2 drops into both eyes 2 times daily       phenytoin (DILANTIN) 100 MG capsule Take 3 capsules by mouth daily       Specialty Vitamins Products (VITAMINS FOR HAIR) TABS Take 1 tablet by mouth daily       White Petrolatum-Mineral Oil (WH PETROL-MINERAL OIL-LANOLIN) 0.1-0.1 % OINT Apply " 1 strip to eye daily       PAST ANTI-SEIZURE MEDICATIONS:  Depakote.    PAST MEDICAL HISTORY:  Kidney tumor, pulmonary valve disorder, status post repair, ventricular septal defect, status post repair.    PAST SURGICAL HISTORY:  Kidney surgery for renal cancer, back surgery for scoliosis and cervical fusion, pulmonary valve replacement, transcatheter PVR, VSD repair/pulmonary valvotomy.      ALLERGIES:  No known drug allergies.    FAMILY HISTORY:  No family history of seizures.    SOCIAL HISTORY:  He had a history of developmental delay.  He had special education in the past.  He is currently living with his mother.  No alcohol, no smoking, no drug abuse.  He is independent for his ADLs.    REVIEW OF SYSTEMS:  Positive for fatigue.    PHYSICAL EXAMINATION:    There were no vitals taken for this visit.    General exam: General Appearance: Obese, No acute distress. HEENT: Normocephalic, atraumatic. Neck: Supple.  Extremities: No edema.     Neurologic Exam: Alert and oriented x3. Speech fluent, appropriate. Normal attention. Cranial Nerves: Pupils are equal, round, reactive to light and accomodation. Extraocular movement intact. No facial weakness or asymmetry. Hearing normal. Motor Exam: Normal. Coordination:no ataxia.  Gait and Station: normal.    PREVIOUS DIAGNOSTIC TESTING:  EEG on 08/26/2021 showed abnormal due to the burst of irregular spike and slow wave activity or sharp and slow waves with a diffuse distribution at times, more prominent over the right hemisphere and at times more prominent over the anterior regions.  Conclusion is that these appear to be epileptogenic.    CT scan of the head on 08/25/2021 was reported negative.    ASSESSMENT AND PLAN:    1.  Epilepsy.  The patient probably has focal impaired seizures and occasional focal to bilateral tonic-clonic seizures.    Since the last visit, he had no seizures. His last seizure was in Aug. 2021, when he was admitted to the hospital. He missed dilantin  dose at that time.  He had MRi in Nov. 2021, which was normal. He is currently taking Tegretol 600 mg b.i.d., Dilantin 360 mg at bedtime and Keppra 1000 mg b.i.d.  The patient has some side effects of fatigue, sleepiness. Mother is wondering if he needs this much meds, can he take less meds? He is compliant with the meds, complains of some fatigue, sleepiness.    2.  Developmental delay.    PLAN:    1. Continue Tegretol 600 mg b.i.d., Dilantin 360 mg at bedtime, Keppra 1000 mg b.i.d.  2.  Mother was advised that he should stay on the current dose of meds.  3.  Return to clinic in 6 months.  Patient was advised to have more exercise and eat healthy. Goal is to lose 10 lb for the next few months.      33 min total time was spent on the day of this visit.      23 min was spent on face to face time  4 min was spent on preparation of visit to review charts and labs, ordering medications and tests  6 min was spent on documentation of clinical information    Sincerely,    Lana Jarquin MD

## 2022-05-13 NOTE — NURSING NOTE
Chief Complaint   Patient presents with     RECHECK     Return seizure maren f/u      Bennett Hall

## 2022-05-13 NOTE — PROGRESS NOTES
"CC: Follow up for seizures.     HPI:  In person follow up.  This patient is a 51-year-old right-handed male with history of developmental delay and longstanding epilepsy. He is accompanied by his mother in the clinic today. Since the last visit, he had no seizures. His last seizure was in Aug. 2021, when he was admitted to the hospital. He missed some dilantin dose at that time (He was supposed to take Dilantin 360 mg at bedtime, but he probably took Dilantin 330 mg at bedtime for about 3 days). Keppra was added after he was admitted to the hospital. He had MRI in Nov. 2021, which was normal. He is currently taking Tegretol 600 mg b.i.d., Dilantin 360 mg at bedtime and Keppra 1000 mg b.i.d.  The patient has some side effects of fatigue, sleepiness. Mother is wondering if he needs this much meds, can he take less meds? He is compliant with the meds, complains of some fatigue, sleepiness.    He was recently admitted to the hospital in Aug. 2021 because of prolonged altered mental status, which was considered to be prolonged seizure.      According to the mother, the patient started to have seizures when he was 7 years old.  Over the years, he had fairly infrequent seizures and he has been taking Tegretol and Dilantin for many years and Keppra was recently added after the hospitalization. The patient is currently taking Tegretol 600 mg b.i.d., Dilantin 300 mg at bedtime and Keppra 1000 mg b.i.d.  The mother stated that the patient is more sleepy after Keppra was added about 2 weeks ago but seems the sleepiness is getting better.    The patient was reported to have 2 types of seizures:      Type 1 is \"petit mal seizure.\"  He will have a behavioral arrest.  He will have staring, which usually lasts for about a minute, then he will be back to his baseline.  This was fairly infrequent over the years, probably less than 1 a year as far as the mother can tell.    Type 2 is generalized tonic-clonic seizures.  The patient had " infrequent generalized tonic-clonic seizures over the years.  Mother reported that he probably only had 3-4 of this type of seizure over his lifetime.    TRIGGERS FOR SEIZURES: Probably flashing light, watching too much TV.    RISK FACTORS FOR SEIZURES:  He had no history of head trauma with loss of consciousness.  No history of CNS infection.  No history of febrile convulsions.  He did have history of developmental delay, but he is independent for his ADLs.    CURRENT MEDICATIONS:  Tegretol 600 mg b.i.d., Dilantin 360 mg at bedtime and Keppra 1000 mg b.i.d.      Current Outpatient Medications   Medication Sig Dispense Refill     carBAMazepine (TEGRETOL) 200 MG tablet Take 3 tablets by mouth 2 times daily       carboxymethylcellulose-glycerin (REFRESH OPTIVE) 0.5-0.9 % ophthalmic solution Place 1 drop into both eyes 6 times daily       cholecalciferol 25 MCG (1000 UT) TABS Take 1,000 Units by mouth daily       clobetasol (TEMOVATE) 0.05 % external cream Apply 1 Application topically 2 times daily       latanoprost (XALATAN) 0.005 % ophthalmic solution Apply 1 drop to eye daily       levETIRAcetam (KEPPRA) 1000 MG tablet Take 1,000 mg by mouth 2 times daily       levOCARNitine (CARNITOR) 330 MG tablet Take 1 tablet (330 mg) by mouth 3 times daily 90 tablet 6     olopatadine (PATANOL) 0.1 % ophthalmic solution Place 1-2 drops into both eyes 2 times daily       phenytoin (DILANTIN) 100 MG capsule Take 3 capsules by mouth daily       Specialty Vitamins Products (VITAMINS FOR HAIR) TABS Take 1 tablet by mouth daily       White Petrolatum-Mineral Oil (WH PETROL-MINERAL OIL-LANOLIN) 0.1-0.1 % OINT Apply 1 strip to eye daily         PAST ANTI-SEIZURE MEDICATIONS:  Depakote.    PAST MEDICAL HISTORY:  Kidney tumor, pulmonary valve disorder, status post repair, ventricular septal defect, status post repair.    PAST SURGICAL HISTORY:  Kidney surgery for renal cancer, back surgery for scoliosis and cervical fusion, pulmonary  valve replacement, transcatheter PVR, VSD repair/pulmonary valvotomy.      ALLERGIES:  No known drug allergies.    FAMILY HISTORY:  No family history of seizures.    SOCIAL HISTORY:  He had a history of developmental delay.  He had special education in the past.  He is currently living with his mother.  No alcohol, no smoking, no drug abuse.  He is independent for his ADLs.    REVIEW OF SYSTEMS:  Positive for fatigue.    PHYSICAL EXAMINATION:    There were no vitals taken for this visit.    General exam: General Appearance: Obese, No acute distress. HEENT: Normocephalic, atraumatic. Neck: Supple.  Extremities: No edema.     Neurologic Exam: Alert and oriented x3. Speech fluent, appropriate. Normal attention. Cranial Nerves: Pupils are equal, round, reactive to light and accomodation. Extraocular movement intact. No facial weakness or asymmetry. Hearing normal. Motor Exam: Normal. Coordination:no ataxia.  Gait and Station: normal.    PREVIOUS DIAGNOSTIC TESTING:  EEG on 08/26/2021 showed abnormal due to the burst of irregular spike and slow wave activity or sharp and slow waves with a diffuse distribution at times, more prominent over the right hemisphere and at times more prominent over the anterior regions.  Conclusion is that these appear to be epileptogenic.    CT scan of the head on 08/25/2021 was reported negative.    ASSESSMENT AND PLAN:    1.  Epilepsy.  The patient probably has focal impaired seizures and occasional focal to bilateral tonic-clonic seizures.    Since the last visit, he had no seizures. His last seizure was in Aug. 2021, when he was admitted to the hospital. He missed dilantin dose at that time.  He had MRi in Nov. 2021, which was normal. He is currently taking Tegretol 600 mg b.i.d., Dilantin 360 mg at bedtime and Keppra 1000 mg b.i.d.  The patient has some side effects of fatigue, sleepiness. Mother is wondering if he needs this much meds, can he take less meds? He is compliant with the  meds, complains of some fatigue, sleepiness.    2.  Developmental delay.    PLAN:    1. Continue Tegretol 600 mg b.i.d., Dilantin 360 mg at bedtime, Keppra 1000 mg b.i.d.  2.  Mother was advised that he should stay on the current dose of meds.  3.  Return to clinic in 6 months.  Patient was advised to have more exercise and eat healthy. Goal is to lose 10 lb for the next few months.      33 min total time was spent on the day of this visit.      23 min was spent on face to face time  4 min was spent on preparation of visit to review charts and labs, ordering medications and tests  6 min was spent on documentation of clinical information

## 2022-11-11 ENCOUNTER — OFFICE VISIT (OUTPATIENT)
Dept: NEUROLOGY | Facility: CLINIC | Age: 52
End: 2022-11-11
Payer: COMMERCIAL

## 2022-11-11 ENCOUNTER — LAB (OUTPATIENT)
Dept: LAB | Facility: CLINIC | Age: 52
End: 2022-11-11
Payer: COMMERCIAL

## 2022-11-11 VITALS
SYSTOLIC BLOOD PRESSURE: 142 MMHG | RESPIRATION RATE: 16 BRPM | HEART RATE: 57 BPM | DIASTOLIC BLOOD PRESSURE: 76 MMHG | OXYGEN SATURATION: 95 % | WEIGHT: 276 LBS

## 2022-11-11 DIAGNOSIS — G40.919 INTRACTABLE EPILEPSY WITHOUT STATUS EPILEPTICUS, UNSPECIFIED EPILEPSY TYPE (H): ICD-10-CM

## 2022-11-11 DIAGNOSIS — G40.919 INTRACTABLE EPILEPSY WITHOUT STATUS EPILEPTICUS, UNSPECIFIED EPILEPSY TYPE (H): Primary | ICD-10-CM

## 2022-11-11 LAB
LEVETIRACETAM SERPL-MCNC: 24.4 ΜG/ML (ref 10–40)
PHENYTOIN SERPL-MCNC: 4.8 UG/ML
SODIUM SERPL-SCNC: 143 MMOL/L (ref 136–145)

## 2022-11-11 PROCEDURE — 36415 COLL VENOUS BLD VENIPUNCTURE: CPT | Performed by: PATHOLOGY

## 2022-11-11 PROCEDURE — 84295 ASSAY OF SERUM SODIUM: CPT | Performed by: PATHOLOGY

## 2022-11-11 PROCEDURE — 99213 OFFICE O/P EST LOW 20 MIN: CPT | Performed by: PSYCHIATRY & NEUROLOGY

## 2022-11-11 PROCEDURE — 80186 ASSAY OF PHENYTOIN FREE: CPT | Mod: 90 | Performed by: PATHOLOGY

## 2022-11-11 PROCEDURE — 99000 SPECIMEN HANDLING OFFICE-LAB: CPT | Performed by: PATHOLOGY

## 2022-11-11 PROCEDURE — 80161 ASY CARBAMAZEPIN 10,11-EPXID: CPT | Mod: 90 | Performed by: PATHOLOGY

## 2022-11-11 PROCEDURE — 80177 DRUG SCRN QUAN LEVETIRACETAM: CPT | Mod: 90 | Performed by: PATHOLOGY

## 2022-11-11 PROCEDURE — 80156 ASSAY CARBAMAZEPINE TOTAL: CPT | Mod: 90 | Performed by: PATHOLOGY

## 2022-11-11 PROCEDURE — 80185 ASSAY OF PHENYTOIN TOTAL: CPT | Mod: 90 | Performed by: PATHOLOGY

## 2022-11-11 ASSESSMENT — PAIN SCALES - GENERAL: PAINLEVEL: MILD PAIN (3)

## 2022-11-11 NOTE — NURSING NOTE
Chief Complaint   Patient presents with     RECHECK     RETURN SEIZURE - 6 mo. F/u     Molina Camp

## 2022-11-11 NOTE — PROGRESS NOTES
"CC: Follow up for seizures.     HPI:  In person follow up.  This patient is a 51-year-old right-handed male with history of developmental delay and longstanding epilepsy. He is accompanied by his mother in the clinic today. He was last seen in May 2022. Since the last visit, he had no seizures. His last seizure was in Aug. 2021, when he was admitted to the hospital. He missed some dilantin dose at that time. Keppra was added after he was admitted to the hospital. He had MRI in Nov. 2021, which was normal. He is currently taking Tegretol 600 mg b.i.d., Dilantin 360 mg at bedtime and Keppra 1000 mg b.i.d.  The patient has some side effects of fatigue, sleepiness. He is compliant with the meds, complains of some fatigue, sleepiness.    He was recently admitted to the hospital in Aug. 2021 because of prolonged altered mental status, which was considered to be prolonged seizure.      According to the mother, the patient started to have seizures when he was 7 years old.  Over the years, he had fairly infrequent seizures and he has been taking Tegretol and Dilantin for many years and Keppra was recently added after the hospitalization. The patient is currently taking Tegretol 600 mg b.i.d., Dilantin 300 mg at bedtime and Keppra 1000 mg b.i.d.  The mother stated that the patient is more sleepy after Keppra was added about 2 weeks ago but seems the sleepiness is getting better.    The patient was reported to have 2 types of seizures:      Type 1 is \"petit mal seizure.\"  He will have a behavioral arrest.  He will have staring, which usually lasts for about a minute, then he will be back to his baseline.  This was fairly infrequent over the years, probably less than 1 a year as far as the mother can tell.    Type 2 is generalized tonic-clonic seizures.  The patient had infrequent generalized tonic-clonic seizures over the years.  Mother reported that he probably only had 3-4 of this type of seizure over his " lifetime.    TRIGGERS FOR SEIZURES: Probably flashing light, watching too much TV.    RISK FACTORS FOR SEIZURES:  He had no history of head trauma with loss of consciousness.  No history of CNS infection.  No history of febrile convulsions.  He did have history of developmental delay, but he is independent for his ADLs.    CURRENT MEDICATIONS:  Tegretol 600 mg b.i.d., Dilantin 360 mg at bedtime and Keppra 1000 mg b.i.d.      Current Outpatient Medications   Medication Sig Dispense Refill     carBAMazepine (TEGRETOL) 200 MG tablet Take 3 tablets (600 mg) by mouth 2 times daily 540 tablet 3     carboxymethylcellulose-glycerin (REFRESH OPTIVE) 0.5-0.9 % ophthalmic solution Place 1 drop into both eyes 6 times daily       cholecalciferol 25 MCG (1000 UT) TABS Take 1,000 Units by mouth daily       clobetasol (TEMOVATE) 0.05 % external cream Apply 1 Application topically 2 times daily       DILANTIN 30 MG capsule Take 2 capsules (60 mg) by mouth At Bedtime 180 capsule 3     latanoprost (XALATAN) 0.005 % ophthalmic solution Apply 1 drop to eye daily       levETIRAcetam (KEPPRA) 1000 MG tablet Take 1 tablet (1,000 mg) by mouth 2 times daily 180 tablet 3     levOCARNitine (CARNITOR) 330 MG tablet Take 1 tablet (330 mg) by mouth 3 times daily 90 tablet 6     olopatadine (PATANOL) 0.1 % ophthalmic solution Place 1-2 drops into both eyes 2 times daily       phenytoin (DILANTIN) 100 MG capsule Take 3 capsules (300 mg) by mouth daily 270 capsule 3     Specialty Vitamins Products (VITAMINS FOR HAIR) TABS Take 1 tablet by mouth daily       White Petrolatum-Mineral Oil (WH PETROL-MINERAL OIL-LANOLIN) 0.1-0.1 % OINT Apply 1 strip to eye daily         PAST ANTI-SEIZURE MEDICATIONS:  Depakote.    PAST MEDICAL HISTORY:  Kidney tumor, pulmonary valve disorder, status post repair, ventricular septal defect, status post repair.    PAST SURGICAL HISTORY:  Kidney surgery for renal cancer, back surgery for scoliosis and cervical fusion,  pulmonary valve replacement, transcatheter PVR, VSD repair/pulmonary valvotomy.      ALLERGIES:  No known drug allergies.    FAMILY HISTORY:  No family history of seizures.    SOCIAL HISTORY:  He had a history of developmental delay.  He had special education in the past.  He is currently living with his mother.  No alcohol, no smoking, no drug abuse.  He is independent for his ADLs.    REVIEW OF SYSTEMS:  Positive for fatigue.    PHYSICAL EXAMINATION:    Blood pressure (!) 142/76, pulse 57, resp. rate 16, weight 125.2 kg (276 lb), SpO2 95 %.    General exam: General Appearance: Obese, No acute distress. HEENT: Normocephalic, atraumatic. Neck: Supple.  Extremities: No edema.     Neurologic Exam: Alert and oriented x3. Speech fluent, appropriate. Normal attention. Cranial Nerves: Pupils are equal, round, reactive to light and accomodation. Extraocular movement intact. No facial weakness or asymmetry. Hearing normal. Motor Exam: Normal. Coordination:no ataxia.  Gait and Station: normal.    PREVIOUS DIAGNOSTIC TESTING:  EEG on 08/26/2021 showed abnormal due to the burst of irregular spike and slow wave activity or sharp and slow waves with a diffuse distribution at times, more prominent over the right hemisphere and at times more prominent over the anterior regions.  Conclusion is that these appear to be epileptogenic.    CT scan of the head on 08/25/2021 was reported negative.    ASSESSMENT AND PLAN:    1.  Epilepsy.  The patient probably has focal impaired seizures and occasional focal to bilateral tonic-clonic seizures.    He was last seen in May 2022. Since the last visit, he had no seizures. His last seizure was in Aug. 2021, when he was admitted to the hospital. He missed some dilantin dose at that time. Keppra was added after he was admitted to the hospital. He had MRI in Nov. 2021, which was normal. He is currently taking Tegretol 600 mg b.i.d., Dilantin 360 mg at bedtime and Keppra 1000 mg b.i.d.      2.   Developmental delay.    PLAN:    1. Continue Tegretol 600 mg b.i.d., Dilantin 360 mg at bedtime, Keppra 1000 mg b.i.d.  2.  Return to clinic in 6 months.  Patient was advised to have more exercise and eat healthy. Goal is to lose 10 lb for the next 6 months.  Mother said that she will try to lose 5 lb for the next 6 months.      26 min total time was spent on the day of this visit.      16 min was spent on face to face time  5 min was spent on preparation of visit to review charts and labs, ordering medications and tests  5 min was spent on documentation of clinical information

## 2022-11-11 NOTE — LETTER
"11/11/2022       RE: Efrain Ellis  [id number] [facility] [address]     Dear Colleague,    Thank you for referring your patient, Efrain Ellis, to the [facility] NEUROLOGY CLINIC [place] at [facility]. Please see a copy of my visit note below.    CC: Follow up for seizures.     HPI:  In person follow up.  This patient is a 51-year-old right-handed male with history of developmental delay and longstanding epilepsy. He is accompanied by his mother in the clinic today. He was last seen in May 2022. Since the last visit, he had no seizures. His last seizure was in Aug. 2021, when he was admitted to the hospital. He missed some dilantin dose at that time. Keppra was added after he was admitted to the hospital. He had MRI in Nov. 2021, which was normal. He is currently taking Tegretol 600 mg b.i.d., Dilantin 360 mg at bedtime and Keppra 1000 mg b.i.d.  The patient has some side effects of fatigue, sleepiness. He is compliant with the meds, complains of some fatigue, sleepiness.    He was recently admitted to the hospital in Aug. 2021 because of prolonged altered mental status, which was considered to be prolonged seizure.      According to the mother, the patient started to have seizures when he was 7 years old.  Over the years, he had fairly infrequent seizures and he has been taking Tegretol and Dilantin for many years and Keppra was recently added after the hospitalization. The patient is currently taking Tegretol 600 mg b.i.d., Dilantin 300 mg at bedtime and Keppra 1000 mg b.i.d.  The mother stated that the patient is more sleepy after Keppra was added about 2 weeks ago but seems the sleepiness is getting better.    The patient was reported to have 2 types of seizures:      Type 1 is \"petit mal seizure.\"  He will have a behavioral arrest.  He will have staring, which usually lasts for about a minute, then he will be back to his baseline.  " This was fairly infrequent over the years, probably less than 1 a year as far as the mother can tell.    Type 2 is generalized tonic-clonic seizures.  The patient had infrequent generalized tonic-clonic seizures over the years.  Mother reported that he probably only had 3-4 of this type of seizure over his lifetime.    TRIGGERS FOR SEIZURES: Probably flashing light, watching too much TV.    RISK FACTORS FOR SEIZURES:  He had no history of head trauma with loss of consciousness.  No history of CNS infection.  No history of febrile convulsions.  He did have history of developmental delay, but he is independent for his ADLs.    CURRENT MEDICATIONS:  Tegretol 600 mg b.i.d., Dilantin 360 mg at bedtime and Keppra 1000 mg b.i.d.      Current Outpatient Medications   Medication Sig Dispense Refill     carBAMazepine (TEGRETOL) 200 MG tablet Take 3 tablets (600 mg) by mouth 2 times daily 540 tablet 3     carboxymethylcellulose-glycerin (REFRESH OPTIVE) 0.5-0.9 % ophthalmic solution Place 1 drop into both eyes 6 times daily       cholecalciferol 25 MCG (1000 UT) TABS Take 1,000 Units by mouth daily       clobetasol (TEMOVATE) 0.05 % external cream Apply 1 Application topically 2 times daily       DILANTIN 30 MG capsule Take 2 capsules (60 mg) by mouth At Bedtime 180 capsule 3     latanoprost (XALATAN) 0.005 % ophthalmic solution Apply 1 drop to eye daily       levETIRAcetam (KEPPRA) 1000 MG tablet Take 1 tablet (1,000 mg) by mouth 2 times daily 180 tablet 3     levOCARNitine (CARNITOR) 330 MG tablet Take 1 tablet (330 mg) by mouth 3 times daily 90 tablet 6     olopatadine (PATANOL) 0.1 % ophthalmic solution Place 1-2 drops into both eyes 2 times daily       phenytoin (DILANTIN) 100 MG capsule Take 3 capsules (300 mg) by mouth daily 270 capsule 3     Specialty Vitamins Products (VITAMINS FOR HAIR) TABS Take 1 tablet by mouth daily       White Petrolatum-Mineral Oil (WH PETROL-MINERAL OIL-LANOLIN) 0.1-0.1 % OINT Apply 1 strip to  eye daily         PAST ANTI-SEIZURE MEDICATIONS:  Depakote.    PAST MEDICAL HISTORY:  Kidney tumor, pulmonary valve disorder, status post repair, ventricular septal defect, status post repair.    PAST SURGICAL HISTORY:  Kidney surgery for renal cancer, back surgery for scoliosis and cervical fusion, pulmonary valve replacement, transcatheter PVR, VSD repair/pulmonary valvotomy.      ALLERGIES:  No known drug allergies.    FAMILY HISTORY:  No family history of seizures.    SOCIAL HISTORY:  He had a history of developmental delay.  He had special education in the past.  He is currently living with his mother.  No alcohol, no smoking, no drug abuse.  He is independent for his ADLs.    REVIEW OF SYSTEMS:  Positive for fatigue.    PHYSICAL EXAMINATION:    Blood pressure (!) 142/76, pulse 57, resp. rate 16, weight 125.2 kg (276 lb), SpO2 95 %.    General exam: General Appearance: Obese, No acute distress. HEENT: Normocephalic, atraumatic. Neck: Supple.  Extremities: No edema.     Neurologic Exam: Alert and oriented x3. Speech fluent, appropriate. Normal attention. Cranial Nerves: Pupils are equal, round, reactive to light and accomodation. Extraocular movement intact. No facial weakness or asymmetry. Hearing normal. Motor Exam: Normal. Coordination:no ataxia.  Gait and Station: normal.    PREVIOUS DIAGNOSTIC TESTING:  EEG on 08/26/2021 showed abnormal due to the burst of irregular spike and slow wave activity or sharp and slow waves with a diffuse distribution at times, more prominent over the right hemisphere and at times more prominent over the anterior regions.  Conclusion is that these appear to be epileptogenic.    CT scan of the head on 08/25/2021 was reported negative.    ASSESSMENT AND PLAN:    1.  Epilepsy.  The patient probably has focal impaired seizures and occasional focal to bilateral tonic-clonic seizures.    He was last seen in May 2022. Since the last visit, he had no seizures. His last seizure was in  Aug. 2021, when he was admitted to the hospital. He missed some dilantin dose at that time. Keppra was added after he was admitted to the hospital. He had MRI in Nov. 2021, which was normal. He is currently taking Tegretol 600 mg b.i.d., Dilantin 360 mg at bedtime and Keppra 1000 mg b.i.d.      2.  Developmental delay.    PLAN:    1. Continue Tegretol 600 mg b.i.d., Dilantin 360 mg at bedtime, Keppra 1000 mg b.i.d.  2.  Return to clinic in 6 months.  Patient was advised to have more exercise and eat healthy. Goal is to lose 10 lb for the next 6 months.  Mother said that she will try to lose 5 lb for the next 6 months.      26 min total time was spent on the day of this visit.      16 min was spent on face to face time  5 min was spent on preparation of visit to review charts and labs, ordering medications and tests  5 min was spent on documentation of clinical information    Sincerely,  Lana Jarquin MD

## 2022-11-13 LAB — PHENYTOIN FREE SERPL-MCNC: 0.6 UG/ML

## 2022-11-15 LAB
CARBAMAZEPINE EP SERPL-MCNC: 1.7 UG/ML
CARBAMAZEPINE SERPL-MCNC: 10.3 UG/ML

## 2023-05-19 ENCOUNTER — TELEPHONE (OUTPATIENT)
Dept: NEUROLOGY | Facility: CLINIC | Age: 53
End: 2023-05-19

## 2023-05-19 DIAGNOSIS — G40.909 RECURRENT SEIZURES (H): ICD-10-CM

## 2023-05-19 NOTE — TELEPHONE ENCOUNTER
Health Call Center    Phone Message    May a detailed message be left on voicemail: yes     Reason for Call: Medication Question or concern regarding medication   Prescription Clarification  Name of Medication: levETIRAcetam (KEPPRA) 1000 MG tablet, DILANTIN 30 MG capsule  Prescribing Provider: Lana Jarquin MD   Pharmacy: [facility] [address]    What on the order needs clarification? Pt's mom Mag is calling because pt's local pharmacy is temporarily closed and pt will be out of his medication in about a week or so. Mag would like new Rx's for Dilantin and Keppra to be sent to the [facility]. Please contact Mag once Rx's have been sent.    Action Taken: Message routed to:  [facility] ([facility] Neurology    Travel Screening: Not Applicable

## 2023-05-22 RX ORDER — LEVETIRACETAM 1000 MG/1
1000 TABLET ORAL 2 TIMES DAILY
Qty: 180 TABLET | Refills: 0 | Status: SHIPPED | OUTPATIENT
Start: 2023-05-22 | End: 2023-05-24

## 2023-05-22 RX ORDER — EXTENDED PHENYTOIN SODIUM 30 MG/1
60 CAPSULE ORAL AT BEDTIME
Qty: 180 CAPSULE | Refills: 0 | Status: SHIPPED | OUTPATIENT
Start: 2023-05-22 | End: 2023-05-24

## 2023-05-22 NOTE — TELEPHONE ENCOUNTER
Medication Refill request received for   Pending Prescriptions:                       Disp   Refills    levETIRAcetam (KEPPRA) 1000 MG tablet     180 ta*3            Sig: Take 1 tablet (1,000 mg) by mouth 2 times daily    DILANTIN 30 MG ER capsule                 180 ca*3            Sig: Take 2 capsules (60 mg) by mouth At Bedtime      Last Written Prescription Date:  5/13/22 - one year supply    Last Office Visit : 11/11/22  Future Office visit:  5/24/23    [place] Medication Refill Protocol requirements:   Latest Reference Range & Units 11/11/22 11:28   Keppra (Levetiracetam) Level 10.0 - 40.0  g/mL 24.4   Phenytoin ug/mL 4.8     Refill request was approved per [place] Medication Refill Protocol requirements.

## 2023-05-24 ENCOUNTER — APPOINTMENT (OUTPATIENT)
Dept: LAB | Facility: CLINIC | Age: 53
End: 2023-05-24
Payer: COMMERCIAL

## 2023-05-24 ENCOUNTER — OFFICE VISIT (OUTPATIENT)
Dept: NEUROLOGY | Facility: CLINIC | Age: 53
End: 2023-05-24
Payer: COMMERCIAL

## 2023-05-24 VITALS
SYSTOLIC BLOOD PRESSURE: 123 MMHG | HEART RATE: 61 BPM | WEIGHT: 282.9 LBS | DIASTOLIC BLOOD PRESSURE: 77 MMHG | OXYGEN SATURATION: 100 %

## 2023-05-24 DIAGNOSIS — G40.909 RECURRENT SEIZURES (H): ICD-10-CM

## 2023-05-24 LAB
LEVETIRACETAM SERPL-MCNC: 20.1 ΜG/ML (ref 10–40)
PHENYTOIN SERPL-MCNC: 14.1 UG/ML
SODIUM SERPL-SCNC: 143 MMOL/L (ref 136–145)

## 2023-05-24 PROCEDURE — 80185 ASSAY OF PHENYTOIN TOTAL: CPT | Mod: 90 | Performed by: PATHOLOGY

## 2023-05-24 PROCEDURE — 80177 DRUG SCRN QUAN LEVETIRACETAM: CPT | Mod: 90 | Performed by: PATHOLOGY

## 2023-05-24 PROCEDURE — 80186 ASSAY OF PHENYTOIN FREE: CPT | Mod: 90 | Performed by: PATHOLOGY

## 2023-05-24 PROCEDURE — 99000 SPECIMEN HANDLING OFFICE-LAB: CPT | Performed by: PATHOLOGY

## 2023-05-24 PROCEDURE — 84295 ASSAY OF SERUM SODIUM: CPT | Performed by: PATHOLOGY

## 2023-05-24 PROCEDURE — 99214 OFFICE O/P EST MOD 30 MIN: CPT | Performed by: PSYCHIATRY & NEUROLOGY

## 2023-05-24 PROCEDURE — 80156 ASSAY CARBAMAZEPINE TOTAL: CPT | Mod: 90 | Performed by: PATHOLOGY

## 2023-05-24 PROCEDURE — 36415 COLL VENOUS BLD VENIPUNCTURE: CPT | Performed by: PATHOLOGY

## 2023-05-24 PROCEDURE — 80161 ASY CARBAMAZEPIN 10,11-EPXID: CPT | Mod: 90 | Performed by: PATHOLOGY

## 2023-05-24 RX ORDER — CARBAMAZEPINE 200 MG/1
600 TABLET ORAL 2 TIMES DAILY
Qty: 540 TABLET | Refills: 3 | Status: SHIPPED | OUTPATIENT
Start: 2023-05-24 | End: 2024-03-01

## 2023-05-24 RX ORDER — PHENYTOIN SODIUM 100 MG/1
300 CAPSULE, EXTENDED RELEASE ORAL DAILY
Qty: 270 CAPSULE | Refills: 3 | Status: SHIPPED | OUTPATIENT
Start: 2023-05-24 | End: 2024-03-01

## 2023-05-24 RX ORDER — LEVETIRACETAM 500 MG/1
500 TABLET ORAL 2 TIMES DAILY
Qty: 180 TABLET | Refills: 3 | Status: SHIPPED | OUTPATIENT
Start: 2023-05-24 | End: 2024-03-01

## 2023-05-24 RX ORDER — EXTENDED PHENYTOIN SODIUM 30 MG/1
60 CAPSULE ORAL AT BEDTIME
Qty: 180 CAPSULE | Refills: 3 | Status: SHIPPED | OUTPATIENT
Start: 2023-05-24 | End: 2023-09-10

## 2023-05-24 ASSESSMENT — PAIN SCALES - GENERAL: PAINLEVEL: NO PAIN (0)

## 2023-05-24 NOTE — LETTER
"5/24/2023       RE: Efrain Ellis  [id number] [facility] [address]         Dear Colleague,    Thank you for referring your patient, Efrain Ellis, to the [facility] NEUROLOGY CLINIC [place] at [facility]. Please see a copy of my visit note below.    CC: Follow up for seizures.     HPI:  In person follow up.  This patient is a 51-year-old right-handed male with history of developmental delay and longstanding epilepsy. He is accompanied by his mother in the clinic today. He was last seen in Nov. 2022. Since the last visit, he had no seizures. His last seizure was in Aug. 2021, when he was admitted to the hospital. He missed some dilantin dose at that time. Keppra was added after he was admitted to the hospital. He had MRI in Nov. 2021, which was normal. He is currently taking Tegretol 600 mg b.i.d., Dilantin 360 mg at bedtime and Keppra 1000 mg b.i.d.  Mother reported that the patient continues to have some side effects of fatigue, sleepiness. He sleeps about 8 hours at night and take 2 hours nap during the day. He is compliant with the meds.    He was recently admitted to the hospital in Aug. 2021 because of prolonged altered mental status, which was considered to be prolonged seizure.      According to the mother, the patient started to have seizures when he was 7 years old.  Over the years, he had fairly infrequent seizures and he has been taking Tegretol and Dilantin for many years and Keppra was recently added after the hospitalization. The patient is currently taking Tegretol 600 mg b.i.d., Dilantin 300 mg at bedtime and Keppra 1000 mg b.i.d.  The mother stated that the patient is more sleepy after Keppra was added about 2 weeks ago but seems the sleepiness is getting better.    The patient was reported to have 2 types of seizures:      Type 1 is \"petit mal seizure.\"  He will have a behavioral arrest.  He will have staring, which " usually lasts for about a minute, then he will be back to his baseline.  This was fairly infrequent over the years, probably less than 1 a year as far as the mother can tell.    Type 2 is generalized tonic-clonic seizures.  The patient had infrequent generalized tonic-clonic seizures over the years.  Mother reported that he probably only had 3-4 of this type of seizure over his lifetime.    TRIGGERS FOR SEIZURES: Probably flashing light, watching too much TV.    RISK FACTORS FOR SEIZURES:  He had no history of head trauma with loss of consciousness.  No history of CNS infection.  No history of febrile convulsions.  He did have history of developmental delay, but he is independent for his ADLs.    CURRENT MEDICATIONS:  Tegretol 600 mg b.i.d., Dilantin 360 mg at bedtime and Keppra 1000 mg b.i.d.      Current Outpatient Medications   Medication Sig Dispense Refill    carBAMazepine (TEGRETOL) 200 MG tablet Take 3 tablets (600 mg) by mouth 2 times daily 540 tablet 3    cholecalciferol 25 MCG (1000 UT) TABS Take 1,000 Units by mouth daily      clobetasol (TEMOVATE) 0.05 % external cream Apply 1 Application topically 2 times daily      DILANTIN 30 MG ER capsule Take 2 capsules (60 mg) by mouth At Bedtime 180 capsule 0    levETIRAcetam (KEPPRA) 1000 MG tablet Take 1 tablet (1,000 mg) by mouth 2 times daily 180 tablet 0    olopatadine (PATANOL) 0.1 % ophthalmic solution Place 1-2 drops into both eyes 2 times daily      phenytoin (DILANTIN) 100 MG capsule Take 3 capsules (300 mg) by mouth daily 270 capsule 3    Specialty Vitamins Products (VITAMINS FOR HAIR) TABS Take 1 tablet by mouth daily      carboxymethylcellulose-glycerin (REFRESH OPTIVE) 0.5-0.9 % ophthalmic solution Place 1 drop into both eyes 6 times daily (Patient not taking: Reported on 5/24/2023)      latanoprost (XALATAN) 0.005 % ophthalmic solution Apply 1 drop to eye daily (Patient not taking: Reported on 5/24/2023)      levOCARNitine (CARNITOR) 330 MG tablet  Take 1 tablet (330 mg) by mouth 3 times daily (Patient not taking: Reported on 5/24/2023) 90 tablet 6    White Petrolatum-Mineral Oil (WH PETROL-MINERAL OIL-LANOLIN) 0.1-0.1 % OINT Apply 1 strip to eye daily (Patient not taking: Reported on 5/24/2023)         PAST ANTI-SEIZURE MEDICATIONS:  Depakote.    PAST MEDICAL HISTORY:  Kidney tumor, pulmonary valve disorder, status post repair, ventricular septal defect, status post repair.    PAST SURGICAL HISTORY:  Kidney surgery for renal cancer, back surgery for scoliosis and cervical fusion, pulmonary valve replacement, transcatheter PVR, VSD repair/pulmonary valvotomy.      ALLERGIES:  No known drug allergies.    FAMILY HISTORY:  No family history of seizures.    SOCIAL HISTORY:  He had a history of developmental delay.  He had special education in the past.  He is currently living with his mother.  No alcohol, no smoking, no drug abuse.  He is independent for his ADLs.    REVIEW OF SYSTEMS:  Positive for fatigue.    PHYSICAL EXAMINATION:    Blood pressure 123/77, pulse 61, weight 128.3 kg (282 lb 14.4 oz), SpO2 100 %.    General exam: General Appearance: Obese, No acute distress. HEENT: Normocephalic, atraumatic. Neck: Supple.  Extremities: No edema.     Neurologic Exam: Alert and oriented x3. Speech fluent, appropriate. Normal attention. Cranial Nerves: Pupils are equal, round, reactive to light and accomodation. Extraocular movement intact. No facial weakness or asymmetry. Hearing normal. Motor Exam: Normal. Coordination:no ataxia.  Gait and Station: normal.    PREVIOUS DIAGNOSTIC TESTING:  EEG on 08/26/2021 showed abnormal due to the burst of irregular spike and slow wave activity or sharp and slow waves with a diffuse distribution at times, more prominent over the right hemisphere and at times more prominent over the anterior regions.  Conclusion is that these appear to be epileptogenic.    CT scan of the head on 08/25/2021 was reported negative.    ASSESSMENT AND  PLAN:    1.  Epilepsy.  The patient probably has focal impaired seizures and occasional focal to bilateral tonic-clonic seizures.    He was last seen in May 2022. Since the last visit, he had no seizures. His last seizure was in Aug. 2021, when he was admitted to the hospital. He missed some dilantin dose at that time. Keppra was added after he was admitted to the hospital. He had MRI in Nov. 2021, which was normal. He is currently taking Tegretol 600 mg b.i.d., Dilantin 360 mg at bedtime and Keppra 1000 mg b.i.d.      2.  Developmental delay.    PLAN:    1. Continue Tegretol 600 mg b.i.d., Dilantin 360 mg at bedtime, Keppra 1000 mg b.i.d.  2.  Labs: CBZ, Dilantin, Keppra, Na  3.  Return to clinic in 3 months.  Patient was advised to have more exercise and eat healthy. Goal is to lose 10 lb for the next 3 months.  Mother said that she will try to make him to lose 5 lb for the next 3 months.      30 min total time was spent on the day of this visit.      20 min was spent on face to face time  10 min was spent on preparation of visit to review charts and labs, ordering medications and tests, and documentation of clinical information          Again, thank you for allowing me to participate in the care of your patient.      Sincerely,    Lana Jarquin MD

## 2023-05-24 NOTE — PROGRESS NOTES
"CC: Follow up for seizures.     HPI:  In person follow up.  This patient is a 51-year-old right-handed male with history of developmental delay and longstanding epilepsy. He is accompanied by his mother in the clinic today. He was last seen in Nov. 2022. Since the last visit, he had no seizures. His last seizure was in Aug. 2021, when he was admitted to the hospital. He missed some dilantin dose at that time. Keppra was added after he was admitted to the hospital. He had MRI in Nov. 2021, which was normal. He is currently taking Tegretol 600 mg b.i.d., Dilantin 360 mg at bedtime and Keppra 1000 mg b.i.d.  Mother reported that the patient continues to have some side effects of fatigue, sleepiness. He sleeps about 8 hours at night and take 2 hours nap during the day. He is compliant with the meds.    He was recently admitted to the hospital in Aug. 2021 because of prolonged altered mental status, which was considered to be prolonged seizure.      According to the mother, the patient started to have seizures when he was 7 years old.  Over the years, he had fairly infrequent seizures and he has been taking Tegretol and Dilantin for many years and Keppra was recently added after the hospitalization. The patient is currently taking Tegretol 600 mg b.i.d., Dilantin 300 mg at bedtime and Keppra 1000 mg b.i.d.  The mother stated that the patient is more sleepy after Keppra was added about 2 weeks ago but seems the sleepiness is getting better.    The patient was reported to have 2 types of seizures:      Type 1 is \"petit mal seizure.\"  He will have a behavioral arrest.  He will have staring, which usually lasts for about a minute, then he will be back to his baseline.  This was fairly infrequent over the years, probably less than 1 a year as far as the mother can tell.    Type 2 is generalized tonic-clonic seizures.  The patient had infrequent generalized tonic-clonic seizures over the years.  Mother reported that he " probably only had 3-4 of this type of seizure over his lifetime.    TRIGGERS FOR SEIZURES: Probably flashing light, watching too much TV.    RISK FACTORS FOR SEIZURES:  He had no history of head trauma with loss of consciousness.  No history of CNS infection.  No history of febrile convulsions.  He did have history of developmental delay, but he is independent for his ADLs.    CURRENT MEDICATIONS:  Tegretol 600 mg b.i.d., Dilantin 360 mg at bedtime and Keppra 1000 mg b.i.d.      Current Outpatient Medications   Medication Sig Dispense Refill     carBAMazepine (TEGRETOL) 200 MG tablet Take 3 tablets (600 mg) by mouth 2 times daily 540 tablet 3     cholecalciferol 25 MCG (1000 UT) TABS Take 1,000 Units by mouth daily       clobetasol (TEMOVATE) 0.05 % external cream Apply 1 Application topically 2 times daily       DILANTIN 30 MG ER capsule Take 2 capsules (60 mg) by mouth At Bedtime 180 capsule 0     levETIRAcetam (KEPPRA) 1000 MG tablet Take 1 tablet (1,000 mg) by mouth 2 times daily 180 tablet 0     olopatadine (PATANOL) 0.1 % ophthalmic solution Place 1-2 drops into both eyes 2 times daily       phenytoin (DILANTIN) 100 MG capsule Take 3 capsules (300 mg) by mouth daily 270 capsule 3     Specialty Vitamins Products (VITAMINS FOR HAIR) TABS Take 1 tablet by mouth daily       carboxymethylcellulose-glycerin (REFRESH OPTIVE) 0.5-0.9 % ophthalmic solution Place 1 drop into both eyes 6 times daily (Patient not taking: Reported on 5/24/2023)       latanoprost (XALATAN) 0.005 % ophthalmic solution Apply 1 drop to eye daily (Patient not taking: Reported on 5/24/2023)       levOCARNitine (CARNITOR) 330 MG tablet Take 1 tablet (330 mg) by mouth 3 times daily (Patient not taking: Reported on 5/24/2023) 90 tablet 6     White Petrolatum-Mineral Oil (WH PETROL-MINERAL OIL-LANOLIN) 0.1-0.1 % OINT Apply 1 strip to eye daily (Patient not taking: Reported on 5/24/2023)         PAST ANTI-SEIZURE MEDICATIONS:  Depakote.    PAST  MEDICAL HISTORY:  Kidney tumor, pulmonary valve disorder, status post repair, ventricular septal defect, status post repair.    PAST SURGICAL HISTORY:  Kidney surgery for renal cancer, back surgery for scoliosis and cervical fusion, pulmonary valve replacement, transcatheter PVR, VSD repair/pulmonary valvotomy.      ALLERGIES:  No known drug allergies.    FAMILY HISTORY:  No family history of seizures.    SOCIAL HISTORY:  He had a history of developmental delay.  He had special education in the past.  He is currently living with his mother.  No alcohol, no smoking, no drug abuse.  He is independent for his ADLs.    REVIEW OF SYSTEMS:  Positive for fatigue.    PHYSICAL EXAMINATION:    Blood pressure 123/77, pulse 61, weight 128.3 kg (282 lb 14.4 oz), SpO2 100 %.    General exam: General Appearance: Obese, No acute distress. HEENT: Normocephalic, atraumatic. Neck: Supple.  Extremities: No edema.     Neurologic Exam: Alert and oriented x3. Speech fluent, appropriate. Normal attention. Cranial Nerves: Pupils are equal, round, reactive to light and accomodation. Extraocular movement intact. No facial weakness or asymmetry. Hearing normal. Motor Exam: Normal. Coordination:no ataxia.  Gait and Station: normal.    PREVIOUS DIAGNOSTIC TESTING:  EEG on 08/26/2021 showed abnormal due to the burst of irregular spike and slow wave activity or sharp and slow waves with a diffuse distribution at times, more prominent over the right hemisphere and at times more prominent over the anterior regions.  Conclusion is that these appear to be epileptogenic.    CT scan of the head on 08/25/2021 was reported negative.    ASSESSMENT AND PLAN:    1.  Epilepsy.  The patient probably has focal impaired seizures and occasional focal to bilateral tonic-clonic seizures.    He was last seen in May 2022. Since the last visit, he had no seizures. His last seizure was in Aug. 2021, when he was admitted to the hospital. He missed some dilantin dose at  that time. Keppra was added after he was admitted to the hospital. He had MRI in Nov. 2021, which was normal. He is currently taking Tegretol 600 mg b.i.d., Dilantin 360 mg at bedtime and Keppra 1000 mg b.i.d.      2.  Developmental delay.    PLAN:    1. Continue Tegretol 600 mg b.i.d., Dilantin 360 mg at bedtime, Keppra 1000 mg b.i.d.  2.  Labs: CBZ, Dilantin, Keppra, Na  3.  Return to clinic in 3 months.  Patient was advised to have more exercise and eat healthy. Goal is to lose 10 lb for the next 3 months.  Mother said that she will try to make him to lose 5 lb for the next 3 months.      30 min total time was spent on the day of this visit.      20 min was spent on face to face time  10 min was spent on preparation of visit to review charts and labs, ordering medications and tests, and documentation of clinical information

## 2023-05-26 LAB — PHENYTOIN FREE SERPL-MCNC: 1.6 UG/ML

## 2023-05-29 LAB
CARBAMAZEPINE EP SERPL-MCNC: 1.8 UG/ML
CARBAMAZEPINE SERPL-MCNC: 8.5 UG/ML

## 2023-08-30 ENCOUNTER — OFFICE VISIT (OUTPATIENT)
Dept: NEUROLOGY | Facility: CLINIC | Age: 53
End: 2023-08-30
Payer: COMMERCIAL

## 2023-08-30 VITALS
SYSTOLIC BLOOD PRESSURE: 121 MMHG | BODY MASS INDEX: 39.48 KG/M2 | RESPIRATION RATE: 16 BRPM | HEIGHT: 71 IN | HEART RATE: 61 BPM | OXYGEN SATURATION: 94 % | DIASTOLIC BLOOD PRESSURE: 71 MMHG | WEIGHT: 282 LBS

## 2023-08-30 DIAGNOSIS — G40.909 SEIZURE DISORDER (H): Primary | ICD-10-CM

## 2023-08-30 PROCEDURE — 99214 OFFICE O/P EST MOD 30 MIN: CPT | Performed by: PSYCHIATRY & NEUROLOGY

## 2023-08-30 ASSESSMENT — PAIN SCALES - GENERAL: PAINLEVEL: MODERATE PAIN (4)

## 2023-08-30 NOTE — PROGRESS NOTES
"CC: Follow up for seizures.     HPI:  In person follow up.  This patient is a 52-year-old right-handed male with history of developmental delay and longstanding epilepsy. He is accompanied by his mother in the clinic today. He was last seen about 3 months ago. Since the last visit, he had no seizures. His last seizure was in Aug. 2021, when he was admitted to the hospital. He missed some dilantin dose at that time. Keppra was added after he was admitted to the hospital. He had MRI in Nov. 2021, which was normal. He is currently taking Tegretol 600 mg b.i.d., Dilantin 360 mg at bedtime and Keppra 500 mg b.i.d.  Mother reported that the patient continues to have some side effects of fatigue, sleepiness. He sleeps about 8 hours at night and take 2 hours nap during the day. He is compliant with the meds.    He was recently admitted to the hospital in Aug. 2021 because of prolonged altered mental status, which was considered to be prolonged seizure.      According to the mother, the patient started to have seizures when he was 7 years old.  Over the years, he had fairly infrequent seizures and he has been taking Tegretol and Dilantin for many years and Keppra was recently added after the hospitalization. The patient is currently taking Tegretol 600 mg b.i.d., Dilantin 300 mg at bedtime and Keppra 1000 mg b.i.d.  The mother stated that the patient is more sleepy after Keppra was added about 2 weeks ago but seems the sleepiness is getting better.    The patient was reported to have 2 types of seizures:      Type 1 is \"petit mal seizure.\"  He will have a behavioral arrest.  He will have staring, which usually lasts for about a minute, then he will be back to his baseline.  This was fairly infrequent over the years, probably less than 1 a year as far as the mother can tell.    Type 2 is generalized tonic-clonic seizures.  The patient had infrequent generalized tonic-clonic seizures over the years.  Mother reported that he " Assess pt for S/I/I/P and SIB, explore healthy coping skills and protective factors, provide support, maintain safety, identify triggers, administer and educate on ordered medications, encourage participation in groups and unit activities probably only had 3-4 of this type of seizure over his lifetime.    TRIGGERS FOR SEIZURES: Probably flashing light, watching too much TV.    RISK FACTORS FOR SEIZURES:  He had no history of head trauma with loss of consciousness.  No history of CNS infection.  No history of febrile convulsions.  He did have history of developmental delay, but he is independent for his ADLs.    CURRENT MEDICATIONS:  Tegretol 600 mg b.i.d., Dilantin 360 mg at bedtime and Keppra 500 mg b.i.d.      Current Outpatient Medications   Medication Sig Dispense Refill    carBAMazepine (TEGRETOL) 200 MG tablet Take 3 tablets (600 mg) by mouth 2 times daily 540 tablet 3    cholecalciferol 25 MCG (1000 UT) TABS Take 1,000 Units by mouth daily      clobetasol (TEMOVATE) 0.05 % external cream Apply 1 Application topically 2 times daily      DILANTIN 30 MG ER capsule Take 2 capsules (60 mg) by mouth At Bedtime 180 capsule 3    levETIRAcetam (KEPPRA) 500 MG tablet Take 1 tablet (500 mg) by mouth 2 times daily 180 tablet 3    olopatadine (PATANOL) 0.1 % ophthalmic solution Place 1-2 drops into both eyes 2 times daily      phenytoin (DILANTIN) 100 MG ER capsule Take 3 capsules (300 mg) by mouth daily 270 capsule 3    Specialty Vitamins Products (VITAMINS FOR HAIR) TABS Take 1 tablet by mouth daily      carboxymethylcellulose-glycerin (REFRESH OPTIVE) 0.5-0.9 % ophthalmic solution Place 1 drop into both eyes 6 times daily (Patient not taking: Reported on 5/24/2023)      latanoprost (XALATAN) 0.005 % ophthalmic solution Apply 1 drop to eye daily (Patient not taking: Reported on 5/24/2023)      levOCARNitine (CARNITOR) 330 MG tablet Take 1 tablet (330 mg) by mouth 3 times daily (Patient not taking: Reported on 5/24/2023) 90 tablet 6    White Petrolatum-Mineral Oil (WH PETROL-MINERAL OIL-LANOLIN) 0.1-0.1 % OINT Apply 1 strip to eye daily (Patient not taking: Reported on 5/24/2023)         PAST ANTI-SEIZURE MEDICATIONS:  Depakote.    PAST MEDICAL HISTORY:   "Kidney tumor, pulmonary valve disorder, status post repair, ventricular septal defect, status post repair.    PAST SURGICAL HISTORY:  Kidney surgery for renal cancer, back surgery for scoliosis and cervical fusion, pulmonary valve replacement, transcatheter PVR, VSD repair/pulmonary valvotomy.      ALLERGIES:  No known drug allergies.    FAMILY HISTORY:  No family history of seizures.    SOCIAL HISTORY:  He had a history of developmental delay.  He had special education in the past.  He is currently living with his mother.  No alcohol, no smoking, no drug abuse.  He is independent for his ADLs.    REVIEW OF SYSTEMS:  Positive for fatigue.    PHYSICAL EXAMINATION:    Blood pressure 121/71, pulse 61, resp. rate 16, height 1.803 m (5' 11\"), weight 127.9 kg (282 lb), SpO2 94 %.    General exam: General Appearance: Obese, No acute distress. HEENT: Normocephalic, atraumatic. Neck: Supple.  Extremities: No edema.     Neurologic Exam: Alert and oriented x3. Speech fluent, appropriate. Normal attention. Cranial Nerves: Pupils are equal, round, reactive to light and accomodation. Extraocular movement intact. No facial weakness or asymmetry. Hearing normal. Motor Exam: Normal. Coordination:no ataxia.  Gait and Station: normal.    PREVIOUS DIAGNOSTIC TESTING:  EEG on 08/26/2021 showed abnormal due to the burst of irregular spike and slow wave activity or sharp and slow waves with a diffuse distribution at times, more prominent over the right hemisphere and at times more prominent over the anterior regions.  Conclusion is that these appear to be epileptogenic.    CT scan of the head on 08/25/2021 was reported negative.    Component      Latest Ref Rng 5/24/2023  2:44 PM   10, 11 Epoxide Level      ug/mL 1.8    Carbamazepine Total Level      4.0 - 12.0 ug/mL 8.5    Keppra (Levetiracetam) Level      10.0 - 40.0  g/mL 20.1    Phenytoin        ug/mL 14.1    Phenytoin Free      1.0 - 2.5 ug/mL 1.6    Sodium      136 - 145 mmol/L 143  "         ASSESSMENT AND PLAN:    1.  Epilepsy.  The patient probably has focal impaired seizures and occasional focal to bilateral tonic-clonic seizures.    He was last seen about 3 months ago. Since the last visit, he had no seizures. His last seizure was in Aug. 2021. He is currently taking Tegretol 600 mg b.i.d., Dilantin 360 mg at bedtime and Keppra 500 mg b.i.d.  Mother reported that the patient continues to have some side effects of fatigue, sleepiness. He sleeps about 8 hours at night and take 2 hours nap during the day. He is compliant with the meds.     2.  Developmental delay.    PLAN:    1. Continue Tegretol 600 mg b.i.d., Dilantin 360 mg at bedtime, Keppra 1000 mg b.i.d.  2.  Return to clinic in 6 months.  Patient was advised to have more exercise and eat healthy. He weighed 275.6 lb today.  Set th goal is to lose 20 lb for the next 6 months.      35 min total time was spent on the day of this visit.      25 min was spent on face to face time  10 min was spent on preparation of visit to review charts and labs, ordering medications and tests, and documentation of clinical information

## 2023-08-30 NOTE — LETTER
"8/30/2023       RE: Efrain Ellis  [id number] [facility] [id number]     Dear Colleague,    Thank you for referring your patient, Efrain Ellis, to the [facility] NEUROLOGY CLINIC [place] at [facility]. Please see a copy of my visit note below.    CC: Follow up for seizures.     HPI:  In person follow up.  This patient is a 52-year-old right-handed male with history of developmental delay and longstanding epilepsy. He is accompanied by his mother in the clinic today. He was last seen about 3 months ago. Since the last visit, he had no seizures. His last seizure was in Aug. 2021, when he was admitted to the hospital. He missed some dilantin dose at that time. Keppra was added after he was admitted to the hospital. He had MRI in Nov. 2021, which was normal. He is currently taking Tegretol 600 mg b.i.d., Dilantin 360 mg at bedtime and Keppra 500 mg b.i.d.  Mother reported that the patient continues to have some side effects of fatigue, sleepiness. He sleeps about 8 hours at night and take 2 hours nap during the day. He is compliant with the meds.    He was recently admitted to the hospital in Aug. 2021 because of prolonged altered mental status, which was considered to be prolonged seizure.      According to the mother, the patient started to have seizures when he was 7 years old.  Over the years, he had fairly infrequent seizures and he has been taking Tegretol and Dilantin for many years and Keppra was recently added after the hospitalization. The patient is currently taking Tegretol 600 mg b.i.d., Dilantin 300 mg at bedtime and Keppra 1000 mg b.i.d.  The mother stated that the patient is more sleepy after Keppra was added about 2 weeks ago but seems the sleepiness is getting better.    The patient was reported to have 2 types of seizures:      Type 1 is \"petit mal seizure.\"  He will have a behavioral arrest.  He will have staring, which " usually lasts for about a minute, then he will be back to his baseline.  This was fairly infrequent over the years, probably less than 1 a year as far as the mother can tell.    Type 2 is generalized tonic-clonic seizures.  The patient had infrequent generalized tonic-clonic seizures over the years.  Mother reported that he probably only had 3-4 of this type of seizure over his lifetime.    TRIGGERS FOR SEIZURES: Probably flashing light, watching too much TV.    RISK FACTORS FOR SEIZURES:  He had no history of head trauma with loss of consciousness.  No history of CNS infection.  No history of febrile convulsions.  He did have history of developmental delay, but he is independent for his ADLs.    CURRENT MEDICATIONS:  Tegretol 600 mg b.i.d., Dilantin 360 mg at bedtime and Keppra 500 mg b.i.d.      Current Outpatient Medications   Medication Sig Dispense Refill    carBAMazepine (TEGRETOL) 200 MG tablet Take 3 tablets (600 mg) by mouth 2 times daily 540 tablet 3    cholecalciferol 25 MCG (1000 UT) TABS Take 1,000 Units by mouth daily      clobetasol (TEMOVATE) 0.05 % external cream Apply 1 Application topically 2 times daily      DILANTIN 30 MG ER capsule Take 2 capsules (60 mg) by mouth At Bedtime 180 capsule 3    levETIRAcetam (KEPPRA) 500 MG tablet Take 1 tablet (500 mg) by mouth 2 times daily 180 tablet 3    olopatadine (PATANOL) 0.1 % ophthalmic solution Place 1-2 drops into both eyes 2 times daily      phenytoin (DILANTIN) 100 MG ER capsule Take 3 capsules (300 mg) by mouth daily 270 capsule 3    Specialty Vitamins Products (VITAMINS FOR HAIR) TABS Take 1 tablet by mouth daily      carboxymethylcellulose-glycerin (REFRESH OPTIVE) 0.5-0.9 % ophthalmic solution Place 1 drop into both eyes 6 times daily (Patient not taking: Reported on 5/24/2023)      latanoprost (XALATAN) 0.005 % ophthalmic solution Apply 1 drop to eye daily (Patient not taking: Reported on 5/24/2023)      levOCARNitine (CARNITOR) 330 MG tablet  "Take 1 tablet (330 mg) by mouth 3 times daily (Patient not taking: Reported on 5/24/2023) 90 tablet 6    White Petrolatum-Mineral Oil (WH PETROL-MINERAL OIL-LANOLIN) 0.1-0.1 % OINT Apply 1 strip to eye daily (Patient not taking: Reported on 5/24/2023)         PAST ANTI-SEIZURE MEDICATIONS:  Depakote.    PAST MEDICAL HISTORY:  Kidney tumor, pulmonary valve disorder, status post repair, ventricular septal defect, status post repair.    PAST SURGICAL HISTORY:  Kidney surgery for renal cancer, back surgery for scoliosis and cervical fusion, pulmonary valve replacement, transcatheter PVR, VSD repair/pulmonary valvotomy.      ALLERGIES:  No known drug allergies.    FAMILY HISTORY:  No family history of seizures.    SOCIAL HISTORY:  He had a history of developmental delay.  He had special education in the past.  He is currently living with his mother.  No alcohol, no smoking, no drug abuse.  He is independent for his ADLs.    REVIEW OF SYSTEMS:  Positive for fatigue.    PHYSICAL EXAMINATION:    Blood pressure 121/71, pulse 61, resp. rate 16, height 1.803 m (5' 11\"), weight 127.9 kg (282 lb), SpO2 94 %.    General exam: General Appearance: Obese, No acute distress. HEENT: Normocephalic, atraumatic. Neck: Supple.  Extremities: No edema.     Neurologic Exam: Alert and oriented x3. Speech fluent, appropriate. Normal attention. Cranial Nerves: Pupils are equal, round, reactive to light and accomodation. Extraocular movement intact. No facial weakness or asymmetry. Hearing normal. Motor Exam: Normal. Coordination:no ataxia.  Gait and Station: normal.    PREVIOUS DIAGNOSTIC TESTING:  EEG on 08/26/2021 showed abnormal due to the burst of irregular spike and slow wave activity or sharp and slow waves with a diffuse distribution at times, more prominent over the right hemisphere and at times more prominent over the anterior regions.  Conclusion is that these appear to be epileptogenic.    CT scan of the head on 08/25/2021 was " reported negative.    Component      Latest Ref Rng 5/24/2023  2:44 PM   10, 11 Epoxide Level      ug/mL 1.8    Carbamazepine Total Level      4.0 - 12.0 ug/mL 8.5    Keppra (Levetiracetam) Level      10.0 - 40.0  g/mL 20.1    Phenytoin        ug/mL 14.1    Phenytoin Free      1.0 - 2.5 ug/mL 1.6    Sodium      136 - 145 mmol/L 143          ASSESSMENT AND PLAN:    1.  Epilepsy.  The patient probably has focal impaired seizures and occasional focal to bilateral tonic-clonic seizures.    He was last seen about 3 months ago. Since the last visit, he had no seizures. His last seizure was in Aug. 2021. He is currently taking Tegretol 600 mg b.i.d., Dilantin 360 mg at bedtime and Keppra 500 mg b.i.d.  Mother reported that the patient continues to have some side effects of fatigue, sleepiness. He sleeps about 8 hours at night and take 2 hours nap during the day. He is compliant with the meds.     2.  Developmental delay.    PLAN:    1. Continue Tegretol 600 mg b.i.d., Dilantin 360 mg at bedtime, Keppra 1000 mg b.i.d.  2.  Return to clinic in 6 months.  Patient was advised to have more exercise and eat healthy. He weighed 275.6 lb today.  Set th goal is to lose 20 lb for the next 6 months.      35 min total time was spent on the day of this visit.      25 min was spent on face to face time  10 min was spent on preparation of visit to review charts and labs, ordering medications and tests, and documentation of clinical information      Again, thank you for allowing me to participate in the care of your patient.      Sincerely,    Lana Jarquin MD

## 2023-09-05 DIAGNOSIS — G40.909 RECURRENT SEIZURES (H): Primary | ICD-10-CM

## 2023-09-10 RX ORDER — EXTENDED PHENYTOIN SODIUM 30 MG/1
60 CAPSULE ORAL AT BEDTIME
Qty: 180 CAPSULE | Refills: 3 | Status: SHIPPED | OUTPATIENT
Start: 2023-09-10 | End: 2024-09-24

## 2024-03-01 ENCOUNTER — OFFICE VISIT (OUTPATIENT)
Dept: NEUROLOGY | Facility: CLINIC | Age: 54
End: 2024-03-01
Payer: COMMERCIAL

## 2024-03-01 VITALS
OXYGEN SATURATION: 94 % | SYSTOLIC BLOOD PRESSURE: 136 MMHG | DIASTOLIC BLOOD PRESSURE: 88 MMHG | HEART RATE: 65 BPM | RESPIRATION RATE: 24 BRPM

## 2024-03-01 DIAGNOSIS — G40.909 RECURRENT SEIZURES (H): ICD-10-CM

## 2024-03-01 PROCEDURE — 99214 OFFICE O/P EST MOD 30 MIN: CPT | Performed by: PSYCHIATRY & NEUROLOGY

## 2024-03-01 RX ORDER — LEVETIRACETAM 500 MG/1
500 TABLET ORAL 2 TIMES DAILY
Qty: 180 TABLET | Refills: 3 | Status: SHIPPED | OUTPATIENT
Start: 2024-03-01

## 2024-03-01 RX ORDER — PHENYTOIN SODIUM 100 MG/1
300 CAPSULE, EXTENDED RELEASE ORAL DAILY
Qty: 270 CAPSULE | Refills: 3 | Status: SHIPPED | OUTPATIENT
Start: 2024-03-01

## 2024-03-01 RX ORDER — CARBAMAZEPINE 200 MG/1
600 TABLET ORAL 2 TIMES DAILY
Qty: 540 TABLET | Refills: 3 | Status: SHIPPED | OUTPATIENT
Start: 2024-03-01

## 2024-03-01 ASSESSMENT — PAIN SCALES - GENERAL: PAINLEVEL: SEVERE PAIN (6)

## 2024-03-01 NOTE — LETTER
"3/1/2024       RE: Efrain Ellis  [id number] [facility] [address]     Dear Colleague,    Thank you for referring your patient, Efrain Ellis, to the [facility] NEUROLOGY CLINIC [place] at [facility]. Please see a copy of my visit note below.    CC: Follow up for seizures.     HPI:  In person follow up.  This patient is a 52-year-old right-handed male with history of developmental delay and longstanding epilepsy. He is accompanied by his sister Chica in the clinic today. He was last seen about 6 months ago. Since the last visit, he had no seizures. His last seizure was in Aug. 2021, when he was admitted to the hospital. He missed some dilantin dose at that time. Keppra was added after he was admitted to the hospital. He had MRI in Nov. 2021, which was normal. He is currently taking Tegretol 600 mg b.i.d., Dilantin 360 mg at bedtime and Keppra 500 mg b.i.d.  No clear side effects were reported.  He sleeps about 8 hours at night and take 2 hours nap during the day. He is compliant with the meds.    He was recently admitted to the hospital in Aug. 2021 because of prolonged altered mental status, which was considered to be prolonged seizure.      According to the mother, the patient started to have seizures when he was 7 years old.  Over the years, he had fairly infrequent seizures and he has been taking Tegretol and Dilantin for many years and Keppra was recently added after the hospitalization. The patient is currently taking Tegretol 600 mg b.i.d., Dilantin 300 mg at bedtime and Keppra 1000 mg b.i.d.  The mother stated that the patient is more sleepy after Keppra was added about 2 weeks ago but seems the sleepiness is getting better.    The patient was reported to have 2 types of seizures:      Type 1 is \"petit mal seizure.\"  He will have a behavioral arrest.  He will have staring, which usually lasts for about a minute, then he will be back " to his baseline.  This was fairly infrequent over the years, probably less than 1 a year as far as the mother can tell.    Type 2 is generalized tonic-clonic seizures.  The patient had infrequent generalized tonic-clonic seizures over the years.  Mother reported that he probably only had 3-4 of this type of seizure over his lifetime.    TRIGGERS FOR SEIZURES: Probably flashing light, watching too much TV.    RISK FACTORS FOR SEIZURES:  He had no history of head trauma with loss of consciousness.  No history of CNS infection.  No history of febrile convulsions.  He did have history of developmental delay, but he is independent for his ADLs.    CURRENT MEDICATIONS:  Tegretol 600 mg b.i.d., Dilantin 360 mg at bedtime and Keppra 500 mg b.i.d.      Current Outpatient Medications   Medication Sig Dispense Refill     carBAMazepine (TEGRETOL) 200 MG tablet Take 3 tablets (600 mg) by mouth 2 times daily 540 tablet 3     carboxymethylcellulose-glycerin (REFRESH OPTIVE) 0.5-0.9 % ophthalmic solution Place 1 drop into both eyes 6 times daily (Patient not taking: Reported on 5/24/2023)       cholecalciferol 25 MCG (1000 UT) TABS Take 1,000 Units by mouth daily       clobetasol (TEMOVATE) 0.05 % external cream Apply 1 Application topically 2 times daily       DILANTIN 30 MG ER capsule Take 2 capsules (60 mg) by mouth At Bedtime 180 capsule 3     latanoprost (XALATAN) 0.005 % ophthalmic solution Apply 1 drop to eye daily (Patient not taking: Reported on 5/24/2023)       levETIRAcetam (KEPPRA) 500 MG tablet Take 1 tablet (500 mg) by mouth 2 times daily 180 tablet 3     levOCARNitine (CARNITOR) 330 MG tablet Take 1 tablet (330 mg) by mouth 3 times daily (Patient not taking: Reported on 5/24/2023) 90 tablet 6     olopatadine (PATANOL) 0.1 % ophthalmic solution Place 1-2 drops into both eyes 2 times daily       phenytoin (DILANTIN) 100 MG ER capsule Take 3 capsules (300 mg) by mouth daily 270 capsule 3     Specialty Vitamins Products  (VITAMINS FOR HAIR) TABS Take 1 tablet by mouth daily       White Petrolatum-Mineral Oil (WH PETROL-MINERAL OIL-LANOLIN) 0.1-0.1 % OINT Apply 1 strip to eye daily (Patient not taking: Reported on 5/24/2023)         PAST ANTI-SEIZURE MEDICATIONS:  Depakote.    PAST MEDICAL HISTORY:  Kidney tumor, pulmonary valve disorder, status post repair, ventricular septal defect, status post repair.    PAST SURGICAL HISTORY:  Kidney surgery for renal cancer, back surgery for scoliosis and cervical fusion, pulmonary valve replacement, transcatheter PVR, VSD repair/pulmonary valvotomy.      ALLERGIES:  No known drug allergies.    FAMILY HISTORY:  No family history of seizures.    SOCIAL HISTORY:  He had a history of developmental delay.  He had special education in the past.  He is currently living with his mother.  No alcohol, no smoking, no drug abuse.  He is independent for his ADLs.    REVIEW OF SYSTEMS:  Positive for fatigue.    PHYSICAL EXAMINATION:    Blood pressure 136/88, pulse 65, resp. rate 24, SpO2 94%.    General exam: General Appearance: Obese, No acute distress. HEENT: Normocephalic, atraumatic. Neck: Supple.  Extremities: No edema.     Neurologic Exam: Alert and oriented x3. Speech fluent, appropriate. Normal attention. Cranial Nerves: Pupils are equal, round, reactive to light and accomodation. Extraocular movement intact. No facial weakness or asymmetry. Hearing normal. Motor Exam: Normal. Coordination:no ataxia.  Gait and Station: normal.    PREVIOUS DIAGNOSTIC TESTING:  EEG on 08/26/2021 showed abnormal due to the burst of irregular spike and slow wave activity or sharp and slow waves with a diffuse distribution at times, more prominent over the right hemisphere and at times more prominent over the anterior regions.  Conclusion is that these appear to be epileptogenic.    CT scan of the head on 08/25/2021 was reported negative.    Component      Latest Ref Rng 5/24/2023  2:44 PM   10, 11 Epoxide Level       ug/mL 1.8    Carbamazepine Total Level      4.0 - 12.0 ug/mL 8.5    Keppra (Levetiracetam) Level      10.0 - 40.0  g/mL 20.1    Phenytoin        ug/mL 14.1    Phenytoin Free      1.0 - 2.5 ug/mL 1.6    Sodium      136 - 145 mmol/L 143          ASSESSMENT AND PLAN:    1.  Epilepsy.  The patient probably has focal impaired seizures and occasional focal to bilateral tonic-clonic seizures.    Since the last visit, he had no seizures. His last seizure was in Aug. 2021, when he was admitted to the hospital.  He is currently taking Tegretol 600 mg b.i.d., Dilantin 360 mg at bedtime and Keppra 500 mg b.i.d.  No clear side effects were reported.  He sleeps about 8 hours at night and take 2 hours nap during the day. He is compliant with the meds.    2.  Developmental delay.    PLAN:    1. Continue Tegretol 600 mg b.i.d., Dilantin 360 mg at bedtime, Keppra 1000 mg b.i.d. Patient uses a pillbox.  2.  Return to clinic in 6 months.  Patient was advised to have more exercise and eat healthy. He weighed 280 lb today.  Set the goal is to lose 15 lb for the next 6 months.      30 min total time was spent on the day of this visit.      20 min was spent on face to face time  10 min was spent on preparation of visit to review charts and labs, ordering medications and tests, and documentation of clinical information      Again, thank you for allowing me to participate in the care of your patient.      Sincerely,    Lana Jarquin MD

## 2024-03-01 NOTE — PATIENT INSTRUCTIONS
Addended by: EMELIA OWEN on: 7/13/2022 10:44 AM     Modules accepted: Orders     PLAN:    1. Continue Tegretol 600 mg b.i.d., Dilantin 360 mg at bedtime, Keppra 1000 mg b.i.d. Patient uses a pillbox.  2.  Return to clinic in 6 months.

## 2024-03-01 NOTE — NURSING NOTE
Chief Complaint   Patient presents with    RECHECK     /88 (BP Location: Left arm, Patient Position: Sitting, Cuff Size: Adult Regular)   Pulse 65   Resp 24   SpO2 94%     YE YEISON

## 2024-03-01 NOTE — PROGRESS NOTES
"CC: Follow up for seizures.     HPI:  In person follow up.  This patient is a 52-year-old right-handed male with history of developmental delay and longstanding epilepsy. He is accompanied by his sister Chica in the clinic today. He was last seen about 6 months ago. Since the last visit, he had no seizures. His last seizure was in Aug. 2021, when he was admitted to the hospital. He missed some dilantin dose at that time. Keppra was added after he was admitted to the hospital. He had MRI in Nov. 2021, which was normal. He is currently taking Tegretol 600 mg b.i.d., Dilantin 360 mg at bedtime and Keppra 500 mg b.i.d.  No clear side effects were reported.  He sleeps about 8 hours at night and take 2 hours nap during the day. He is compliant with the meds.    He was recently admitted to the hospital in Aug. 2021 because of prolonged altered mental status, which was considered to be prolonged seizure.      According to the mother, the patient started to have seizures when he was 7 years old.  Over the years, he had fairly infrequent seizures and he has been taking Tegretol and Dilantin for many years and Keppra was recently added after the hospitalization. The patient is currently taking Tegretol 600 mg b.i.d., Dilantin 300 mg at bedtime and Keppra 1000 mg b.i.d.  The mother stated that the patient is more sleepy after Keppra was added about 2 weeks ago but seems the sleepiness is getting better.    The patient was reported to have 2 types of seizures:      Type 1 is \"petit mal seizure.\"  He will have a behavioral arrest.  He will have staring, which usually lasts for about a minute, then he will be back to his baseline.  This was fairly infrequent over the years, probably less than 1 a year as far as the mother can tell.    Type 2 is generalized tonic-clonic seizures.  The patient had infrequent generalized tonic-clonic seizures over the years.  Mother reported that he probably only had 3-4 of this type of seizure over " his lifetime.    TRIGGERS FOR SEIZURES: Probably flashing light, watching too much TV.    RISK FACTORS FOR SEIZURES:  He had no history of head trauma with loss of consciousness.  No history of CNS infection.  No history of febrile convulsions.  He did have history of developmental delay, but he is independent for his ADLs.    CURRENT MEDICATIONS:  Tegretol 600 mg b.i.d., Dilantin 360 mg at bedtime and Keppra 500 mg b.i.d.      Current Outpatient Medications   Medication Sig Dispense Refill    carBAMazepine (TEGRETOL) 200 MG tablet Take 3 tablets (600 mg) by mouth 2 times daily 540 tablet 3    carboxymethylcellulose-glycerin (REFRESH OPTIVE) 0.5-0.9 % ophthalmic solution Place 1 drop into both eyes 6 times daily (Patient not taking: Reported on 5/24/2023)      cholecalciferol 25 MCG (1000 UT) TABS Take 1,000 Units by mouth daily      clobetasol (TEMOVATE) 0.05 % external cream Apply 1 Application topically 2 times daily      DILANTIN 30 MG ER capsule Take 2 capsules (60 mg) by mouth At Bedtime 180 capsule 3    latanoprost (XALATAN) 0.005 % ophthalmic solution Apply 1 drop to eye daily (Patient not taking: Reported on 5/24/2023)      levETIRAcetam (KEPPRA) 500 MG tablet Take 1 tablet (500 mg) by mouth 2 times daily 180 tablet 3    levOCARNitine (CARNITOR) 330 MG tablet Take 1 tablet (330 mg) by mouth 3 times daily (Patient not taking: Reported on 5/24/2023) 90 tablet 6    olopatadine (PATANOL) 0.1 % ophthalmic solution Place 1-2 drops into both eyes 2 times daily      phenytoin (DILANTIN) 100 MG ER capsule Take 3 capsules (300 mg) by mouth daily 270 capsule 3    Specialty Vitamins Products (VITAMINS FOR HAIR) TABS Take 1 tablet by mouth daily      White Petrolatum-Mineral Oil (WH PETROL-MINERAL OIL-LANOLIN) 0.1-0.1 % OINT Apply 1 strip to eye daily (Patient not taking: Reported on 5/24/2023)         PAST ANTI-SEIZURE MEDICATIONS:  Depakote.    PAST MEDICAL HISTORY:  Kidney tumor, pulmonary valve disorder, status post  repair, ventricular septal defect, status post repair.    PAST SURGICAL HISTORY:  Kidney surgery for renal cancer, back surgery for scoliosis and cervical fusion, pulmonary valve replacement, transcatheter PVR, VSD repair/pulmonary valvotomy.      ALLERGIES:  No known drug allergies.    FAMILY HISTORY:  No family history of seizures.    SOCIAL HISTORY:  He had a history of developmental delay.  He had special education in the past.  He is currently living with his mother.  No alcohol, no smoking, no drug abuse.  He is independent for his ADLs.    REVIEW OF SYSTEMS:  Positive for fatigue.    PHYSICAL EXAMINATION:    Blood pressure 136/88, pulse 65, resp. rate 24, SpO2 94%.    General exam: General Appearance: Obese, No acute distress. HEENT: Normocephalic, atraumatic. Neck: Supple.  Extremities: No edema.     Neurologic Exam: Alert and oriented x3. Speech fluent, appropriate. Normal attention. Cranial Nerves: Pupils are equal, round, reactive to light and accomodation. Extraocular movement intact. No facial weakness or asymmetry. Hearing normal. Motor Exam: Normal. Coordination:no ataxia.  Gait and Station: normal.    PREVIOUS DIAGNOSTIC TESTING:  EEG on 08/26/2021 showed abnormal due to the burst of irregular spike and slow wave activity or sharp and slow waves with a diffuse distribution at times, more prominent over the right hemisphere and at times more prominent over the anterior regions.  Conclusion is that these appear to be epileptogenic.    CT scan of the head on 08/25/2021 was reported negative.    Component      Latest Ref Rng 5/24/2023  2:44 PM   10, 11 Epoxide Level      ug/mL 1.8    Carbamazepine Total Level      4.0 - 12.0 ug/mL 8.5    Keppra (Levetiracetam) Level      10.0 - 40.0  g/mL 20.1    Phenytoin        ug/mL 14.1    Phenytoin Free      1.0 - 2.5 ug/mL 1.6    Sodium      136 - 145 mmol/L 143          ASSESSMENT AND PLAN:    1.  Epilepsy.  The patient probably has focal impaired seizures and  occasional focal to bilateral tonic-clonic seizures.    Since the last visit, he had no seizures. His last seizure was in Aug. 2021, when he was admitted to the hospital.  He is currently taking Tegretol 600 mg b.i.d., Dilantin 360 mg at bedtime and Keppra 500 mg b.i.d.  No clear side effects were reported.  He sleeps about 8 hours at night and take 2 hours nap during the day. He is compliant with the meds.    2.  Developmental delay.    PLAN:    1. Continue Tegretol 600 mg b.i.d., Dilantin 360 mg at bedtime, Keppra 1000 mg b.i.d. Patient uses a pillbox.  2.  Return to clinic in 6 months.  Patient was advised to have more exercise and eat healthy. He weighed 280 lb today.  Set the goal is to lose 15 lb for the next 6 months.      30 min total time was spent on the day of this visit.      20 min was spent on face to face time  10 min was spent on preparation of visit to review charts and labs, ordering medications and tests, and documentation of clinical information

## 2024-05-09 DIAGNOSIS — G40.909 RECURRENT SEIZURES (H): ICD-10-CM

## 2024-05-09 RX ORDER — LEVETIRACETAM 1000 MG/1
1000 TABLET ORAL 2 TIMES DAILY
Qty: 180 TABLET | Refills: 0 | OUTPATIENT
Start: 2024-05-09

## 2024-09-16 ENCOUNTER — OFFICE VISIT (OUTPATIENT)
Dept: NEUROLOGY | Facility: CLINIC | Age: 54
End: 2024-09-16
Payer: COMMERCIAL

## 2024-09-16 VITALS
HEART RATE: 70 BPM | DIASTOLIC BLOOD PRESSURE: 82 MMHG | BODY MASS INDEX: 38.63 KG/M2 | SYSTOLIC BLOOD PRESSURE: 133 MMHG | OXYGEN SATURATION: 100 % | WEIGHT: 277 LBS

## 2024-09-16 DIAGNOSIS — G40.919 INTRACTABLE EPILEPSY WITHOUT STATUS EPILEPTICUS, UNSPECIFIED EPILEPSY TYPE (H): Primary | ICD-10-CM

## 2024-09-16 DIAGNOSIS — M83.9 ADULT OSTEOMALACIA, UNSPECIFIED: ICD-10-CM

## 2024-09-16 PROCEDURE — 99417 PROLNG OP E/M EACH 15 MIN: CPT | Performed by: INTERNAL MEDICINE

## 2024-09-16 PROCEDURE — 99215 OFFICE O/P EST HI 40 MIN: CPT | Performed by: INTERNAL MEDICINE

## 2024-09-16 NOTE — PROGRESS NOTES
"[facility]    Patient:  Efrani Ellis  :  1970   Age:  53 year old   Today's Office Visit:  2024    Epilepsy Data:  Mr. Ellis is a 53 year old gentleman  With a medical history significant for    He was previously seen by my colleague Dr. Jarquin.  In summary, his seizures started when he was 7 years old.  Prior to that he was reported to have had a difficulty deliver, his sister was unsure but said something about an umbilical cord problem during birth. He had both focal and generalized seizures infrequently throughout the years, he was on phenytoin and carbamazepine.  In  he was admitted for breakthrough seizures this time and additional levetiracetam 500 to 1000 mg twice daily was added.    This was his last seizure and has been doing relatively well on this dose.  There are no concerns for breakthrough seizures or symptoms concerning for breakthrough seizures.    His sister reports that there were concerns for depression in the past couple of months.  They stated that it started about 1 to 2 years ago when he lost his dad which he was really close to.  And then the changed apartments which was also rough for him.  His mother is also currently incapacitated following a knee surgery.  All of these new changes have been very rough for him.  Reported having episodes of feeling very sad and getting teary.  He denies any passive or active suicidal ideation.  He just reports that he wants to be happy.  I discussed that he would likely benefit from antidepressant medications and there are several options that can be trialed that are compatible with his current antiepileptic medications.  they were agreeable to this.    Type 1 is \"petit mal seizure.\"  He will have a behavioral arrest.  He will have staring, which usually lasts for about a minute, then he will be back to his baseline.  This was fairly infrequent over the years, probably less than 1 a year as far as the mother " can tell.     Type 2 is generalized tonic-clonic seizures.  The patient had infrequent generalized tonic-clonic seizures over the years.  Mother reported that he probably only had 3-4 of this type of seizure over his lifetime.     TRIGGERS FOR SEIZURES: Probably flashing light, watching too much TV.     RISK FACTORS FOR SEIZURES:  He had no history of head trauma with loss of consciousness.  No history of CNS infection.  No history of febrile convulsions.  He did have history of developmental delay, but he is independent for his ADLs.     CURRENT MEDICATIONS:  Tegretol 600 mg b.i.d., Dilantin 360 mg at bedtime and Keppra 500 mg b.i.d.       PAST MEDICAL HISTORY:  Kidney tumor, pulmonary valve disorder, status post repair, ventricular septal defect, status post repair.     PAST SURGICAL HISTORY:  Kidney surgery for renal cancer, back surgery for scoliosis and cervical fusion, pulmonary valve replacement, transcatheter PVR, VSD repair/pulmonary valvotomy.       ALLERGIES:  No known drug allergies.     FAMILY HISTORY:  No family history of seizures.     SOCIAL HISTORY:  He had a history of developmental delay.  He had special education in the past.  He is currently living with his mother.  No alcohol, no smoking, no drug abuse.  He is independent for his ADLs.      PRIOR INVESTIGATIONS  EEG on 08/26/2021 showed abnormal due to the burst of irregular spike and slow wave activity or sharp and slow waves with a diffuse distribution at times, more prominent over the right hemisphere and at times more prominent over the anterior regions.  Conclusion is that these appear to be epileptogenic.    MRI brain  11/21:   Impression:  No intracranial mass or hemorrhage. No abnormality of the mesial  temporal lobes.   A single focus of FLAIR signal abnormality in the left parietal  subcortical white matter. This is a nonspecific finding most likely  representing gliosis from prior inflammation or ischemia.          General exam:  General Appearance: Obese, No acute distress. HEENT: Normocephalic, atraumatic. Neck: Supple.  Extremities: No edema.      Neurologic Exam: Alert and oriented x3. Speech fluent, appropriate. Normal attention. Cranial Nerves: Pupils are equal, round, reactive to light and accomodation. Extraocular movement intact. No facial weakness or asymmetry. Hearing normal. Motor Exam: Normal. Coordination:no ataxia.  Gait and Station: normal.    Impression  Mr Ellis is a 53-year-old with a history of focal epilepsy that began in childhood. He is currently doing very well and is seizure-free on phenytoin, carbamazepine, and levetiracetam. He is accompanied by his sister Cally, who reported recent depressive symptoms, which started after significant life events: the loss of his father, a move to a new apartment, and his mother s recent knee surgery. I suggested starting antidepressant medication, as several SSRIs are compatible with his current antiepileptic medications. He would discuss this with his PCP. We also discussed that although neuropsychiatric side effects, including depression, have been attributed to levetiracetam, his symptoms seem situational at this point. He denies passive or active suicidal ideation, and if his condition worsens, we can consider discontinuing levetiracetam. For now, it seems best to maintain his current antiepileptic regimen. I will obtain medication levels to ensure they are not toxic. They agreed with this approach.    I also discussed the risk of fractures due to bone disease associated with carbamazepine and phenytoin and recommended daily vitamin D and calcium supplements.    Diagnoses:    Focal epilepsy  Developmental delay  Depression  Obesity  Next Steps:    Order CBC, CMP, trough levels for phenytoin, carbamazepine, and levetiracetam, and vitamin D levels. They prefer to have this done at the PCP's office.  Continue current doses of antiseizure medications: Levetiracetam 500 mg  twice daily, phenytoin 360 mg nightly, carbamazepine 600 mg twice daily.  Vit D and Calcium daily  Return to clinic in 6 months.    I spent  60 minutes in total today to provide comprehensive  medical care.   I spent 15  minutes writing the note and placing orders.   I spent 10 minutes  reviewing the chart.      The rest of the time was spent with the patient in face to face interview. During this time key medical decisions were made with review of medical chart prior to visit, visit with patient, counseling/education, and post visit work, including documentation of note on the day of visit. I addressed all questions the patient/caregiver raised in regards to epilepsy or related medical questions.

## 2024-09-16 NOTE — LETTER
2024       RE: Efrain Ellis  [id number] [facility] [address]     Dear Colleague,    Thank you for referring your patient, Efrain Ellis, to the [facility]. Please see a copy of my visit note below.    [facility] Epilepsy Care Progress Note    Patient:  Efrain Ellis  :  1970   Age:  53 year old   Today's Office Visit:  2024    Epilepsy Data:  Mr. Ellis is a 53 year old gentleman  With a medical history significant for    He was previously seen by my colleague Dr. Jarquin.  In summary, his seizures started when he was 7 years old.  Prior to that he was reported to have had a difficulty deliver, his sister was unsure but said something about an umbilical cord problem during birth. He had both focal and generalized seizures infrequently throughout the years, he was on phenytoin and carbamazepine.  In  he was admitted for breakthrough seizures this time and additional levetiracetam 500 to 1000 mg twice daily was added.    This was his last seizure and has been doing relatively well on this dose.  There are no concerns for breakthrough seizures or symptoms concerning for breakthrough seizures.    His sister reports that there were concerns for depression in the past couple of months.  They stated that it started about 1 to 2 years ago when he lost his dad which he was really close to.  And then the changed apartments which was also rough for him.  His mother is also currently incapacitated following a knee surgery.  All of these new changes have been very rough for him.  Reported having episodes of feeling very sad and getting teary.  He denies any passive or active suicidal ideation.  He just reports that he wants to be happy.  I discussed that he would likely benefit from antidepressant medications and there are several options that can be trialed that are compatible with his  "current antiepileptic medications.  they were agreeable to this.    Type 1 is \"petit mal seizure.\"  He will have a behavioral arrest.  He will have staring, which usually lasts for about a minute, then he will be back to his baseline.  This was fairly infrequent over the years, probably less than 1 a year as far as the mother can tell.     Type 2 is generalized tonic-clonic seizures.  The patient had infrequent generalized tonic-clonic seizures over the years.  Mother reported that he probably only had 3-4 of this type of seizure over his lifetime.     TRIGGERS FOR SEIZURES: Probably flashing light, watching too much TV.     RISK FACTORS FOR SEIZURES:  He had no history of head trauma with loss of consciousness.  No history of CNS infection.  No history of febrile convulsions.  He did have history of developmental delay, but he is independent for his ADLs.     CURRENT MEDICATIONS:  Tegretol 600 mg b.i.d., Dilantin 360 mg at bedtime and Keppra 500 mg b.i.d.       PAST MEDICAL HISTORY:  Kidney tumor, pulmonary valve disorder, status post repair, ventricular septal defect, status post repair.     PAST SURGICAL HISTORY:  Kidney surgery for renal cancer, back surgery for scoliosis and cervical fusion, pulmonary valve replacement, transcatheter PVR, VSD repair/pulmonary valvotomy.       ALLERGIES:  No known drug allergies.     FAMILY HISTORY:  No family history of seizures.     SOCIAL HISTORY:  He had a history of developmental delay.  He had special education in the past.  He is currently living with his mother.  No alcohol, no smoking, no drug abuse.  He is independent for his ADLs.      PRIOR INVESTIGATIONS  EEG on 08/26/2021 showed abnormal due to the burst of irregular spike and slow wave activity or sharp and slow waves with a diffuse distribution at times, more prominent over the right hemisphere and at times more prominent over the anterior regions.  Conclusion is that these appear to be epileptogenic.    MRI " brain  11/21:   Impression:  No intracranial mass or hemorrhage. No abnormality of the mesial  temporal lobes.   A single focus of FLAIR signal abnormality in the left parietal  subcortical white matter. This is a nonspecific finding most likely  representing gliosis from prior inflammation or ischemia.          General exam: General Appearance: Obese, No acute distress. HEENT: Normocephalic, atraumatic. Neck: Supple.  Extremities: No edema.      Neurologic Exam: Alert and oriented x3. Speech fluent, appropriate. Normal attention. Cranial Nerves: Pupils are equal, round, reactive to light and accomodation. Extraocular movement intact. No facial weakness or asymmetry. Hearing normal. Motor Exam: Normal. Coordination:no ataxia.  Gait and Station: normal.    Impression  Mr Ellis is a 53-year-old with a history of focal epilepsy that began in childhood. He is currently doing very well and is seizure-free on phenytoin, carbamazepine, and levetiracetam. He is accompanied by his sister Cally, who reported recent depressive symptoms, which started after significant life events: the loss of his father, a move to a new apartment, and his mother s recent knee surgery. I suggested starting antidepressant medication, as several SSRIs are compatible with his current antiepileptic medications. He would discuss this with his PCP. We also discussed that although neuropsychiatric side effects, including depression, have been attributed to levetiracetam, his symptoms seem situational at this point. He denies passive or active suicidal ideation, and if his condition worsens, we can consider discontinuing levetiracetam. For now, it seems best to maintain his current antiepileptic regimen. I will obtain medication levels to ensure they are not toxic. They agreed with this approach.    I also discussed the risk of fractures due to bone disease associated with carbamazepine and phenytoin and recommended daily vitamin D and calcium  supplements.    Diagnoses:    Focal epilepsy  Developmental delay  Depression  Obesity  Next Steps:    Order CBC, CMP, trough levels for phenytoin, carbamazepine, and levetiracetam, and vitamin D levels. They prefer to have this done at the PCP's office.  Continue current doses of antiseizure medications: Levetiracetam 500 mg twice daily, phenytoin 360 mg nightly, carbamazepine 600 mg twice daily.  Vit D and Calcium daily  Return to clinic in 6 months.    I spent  60 minutes in total today to provide comprehensive  medical care.   I spent 15  minutes writing the note and placing orders.   I spent 10 minutes  reviewing the chart.      The rest of the time was spent with the patient in face to face interview. During this time key medical decisions were made with review of medical chart prior to visit, visit with patient, counseling/education, and post visit work, including documentation of note on the day of visit. I addressed all questions the patient/caregiver raised in regards to epilepsy or related medical questions.                  Again, thank you for allowing me to participate in the care of your patient.      Sincerely,    Portia Gipson MD

## 2024-09-17 DIAGNOSIS — G40.909 RECURRENT SEIZURES (H): ICD-10-CM

## 2024-09-24 RX ORDER — EXTENDED PHENYTOIN SODIUM 30 MG/1
60 CAPSULE ORAL AT BEDTIME
Qty: 60 CAPSULE | Refills: 0 | Status: SHIPPED | OUTPATIENT
Start: 2024-09-24

## 2024-09-26 ENCOUNTER — TELEPHONE (OUTPATIENT)
Dept: NEUROLOGY | Facility: CLINIC | Age: 54
End: 2024-09-26
Payer: COMMERCIAL

## 2024-09-26 NOTE — TELEPHONE ENCOUNTER
Left Voicemail (1st Attempt) for the patient to call back and schedule the following:    Appointment type: Return Seizure  Provider: Brandan  Return date: around 3/16/2025  Specialty phone number: [phone]  Additional appointment(s) needed: Labs  Additonal Notes: 6 month follow up      Lois Aponte on 9/26/2024 at 4:41 PM

## 2024-09-30 NOTE — TELEPHONE ENCOUNTER
Left Voicemail (2nd Attempt), Letter sent for the patient to call back and schedule the following:    Appointment type: Return Seizure  Provider: Brandan  Return date: around 3/16/2025  Specialty phone number: [phone]  Additional appointment(s) needed: Labs  Additonal Notes: 6 month follow up    Lois Aponte on 9/30/2024 at 2:47 PM

## 2024-10-05 ENCOUNTER — RESULTS ONLY (OUTPATIENT)
Dept: LAB | Facility: CLINIC | Age: 54
End: 2024-10-05
Payer: COMMERCIAL

## 2024-10-08 LAB
ALBUMIN (EXTERNAL): 4.1 G/DL (ref 4–4.9)
ALK PHOSPHATASE (EXTERNAL): 162 IU/L (ref 40–129)
ALT SERPL-CCNC: 54 IU/L (ref 10–50)
ANION GAP SERPL CALC-SCNC: 10 MMOL/L (ref 5–18)
AST SERPL-CCNC: 50 IU/L (ref 10–50)
BILIRUB SERPL-MCNC: 0.5 MG/DL (ref 0–1.2)
BUN SERPL-MCNC: 19 MG/DL (ref 6–20)
BUN/CREATININE RATIO (EXTERNAL): 14 (ref 10–20)
CALCIUM (EXTERNAL): 9.3 MG/DL (ref 8.6–10)
CHLORIDE (EXTERNAL): 103 MMOL/L (ref 98–107)
CO2 (EXTERNAL): 28 MMOL/L (ref 22–29)
CREATININE (EXTERNAL): 1.33 MG/DL (ref 0.7–1.2)
ERYTHROCYTE [DISTWIDTH] IN BLOOD BY AUTOMATED COUNT: 11.9 % (ref 11.5–15.5)
GFR ESTIMATED (EXTERNAL): 64 ML/MIN/1.73M2
GLUCOSE (EXTERNAL): 88 MG/DL (ref 70–99)
HEMATOCRIT (EXTERNAL): 41.3 % (ref 37–53)
HEMOGLOBIN (EXTERNAL): 14.9 G/DL (ref 13.5–17.5)
Lab: 18.8 UG/ML (ref 10–20)
Lab: 4.4 UG/ML (ref 4–12)
Lab: 4.7 UG/ML (ref 6–46)
MCH RBC QN AUTO: 35 PG (ref 26–34)
MCHC RBC AUTO-ENTMCNC: 36.1 G/DL (ref 32–36)
MCV RBC AUTO: 97 FL (ref 80–100)
PLATELET COUNT (EXTERNAL): 90 THOU/CU MM (ref 140–440)
POTASSIUM (EXTERNAL): 4.6 MMOL/L (ref 3.5–5.1)
PROTEIN TOTAL (EXTERNAL): 6.5 G/DL (ref 6–8)
RBC # BLD AUTO: 4.26 MIL/CU MM (ref 4.3–5.9)
SODIUM (EXTERNAL): 141 MMOL/L (ref 138–145)
WBC COUNT (EXTERNAL): 3.9 THOU/CU MM (ref 4.5–11)

## 2024-10-23 DIAGNOSIS — G40.909 RECURRENT SEIZURES (H): ICD-10-CM

## 2024-10-28 RX ORDER — EXTENDED PHENYTOIN SODIUM 30 MG/1
60 CAPSULE ORAL AT BEDTIME
Qty: 60 CAPSULE | Refills: 0 | Status: SHIPPED | OUTPATIENT
Start: 2024-10-28

## 2024-11-14 DIAGNOSIS — G40.909 RECURRENT SEIZURES (H): ICD-10-CM

## 2024-11-18 RX ORDER — EXTENDED PHENYTOIN SODIUM 30 MG/1
60 CAPSULE ORAL AT BEDTIME
Qty: 60 CAPSULE | Refills: 5 | Status: SHIPPED | OUTPATIENT
Start: 2024-11-18

## 2024-11-18 NOTE — TELEPHONE ENCOUNTER
Medication Requested:   Disp Refills Start End BIB   DILANTIN 30 MG ER capsule 60 capsule 0 10/28/2024 -- Yes   Sig - Route: Take 2 Capsules (60 mg) by mouth at bedtime. - Oral     ----------------------  Last Office Visit : 9/16/2024  [facility] Office visit:     3/14/2025 10:30 AM (60 min)  [place]   Arrive by: 10:15 AM   RETURN SEIZURE   [facility] ([facility])   Portia Gipson MD     ----------------------      [x]Medication refilled per  Medication Refill in Ambulatory Care  policy.        Pass/Fail Protocol Criteria:  [place] Medication Refill Protocol - Seizure Medications (Non-Controlled)  Phenytoin (Dilantin)    Encounter/Office Visit: Last 12 mo/next 30 days  Required Tests: AED (past  26mo):   PHENYTOIN LEVEL (EXTERNAL)  10.0 - 20.0 ug/mL 18.8 Specimen Collected: 10/05/24      Refills allowed:  93 days + 3 refills  Or  31 days + 11 refills

## 2025-03-14 PROCEDURE — 80177 DRUG SCRN QUAN LEVETIRACETAM: CPT | Performed by: INTERNAL MEDICINE

## 2025-03-14 PROCEDURE — 80185 ASSAY OF PHENYTOIN TOTAL: CPT | Performed by: INTERNAL MEDICINE

## 2025-03-17 ENCOUNTER — TELEPHONE (OUTPATIENT)
Dept: NEUROLOGY | Facility: CLINIC | Age: 55
End: 2025-03-17
Payer: COMMERCIAL

## 2025-03-17 DIAGNOSIS — G40.909 RECURRENT SEIZURES (H): ICD-10-CM

## 2025-03-17 RX ORDER — EXTENDED PHENYTOIN SODIUM 30 MG/1
30 CAPSULE, EXTENDED RELEASE ORAL AT BEDTIME
Qty: 60 CAPSULE | Refills: 5 | Status: SHIPPED | OUTPATIENT
Start: 2025-03-17

## 2025-06-17 ENCOUNTER — TELEPHONE (OUTPATIENT)
Dept: NEUROLOGY | Facility: CLINIC | Age: 55
End: 2025-06-17
Payer: COMMERCIAL

## 2025-06-17 NOTE — TELEPHONE ENCOUNTER
NO VM,No MyC for the patient to call back and schedule the following:    Appointment type: Return Seizure  Provider: Brandan  Return date: Around 9/12/25  Specialty phone number: [phone]  Additional appointment(s) needed: na  Additonal Notes:     Follow up on 9/12 needs to be rescheduled due to a change in the providers schedule.

## 2025-06-19 ENCOUNTER — TELEPHONE (OUTPATIENT)
Dept: NEUROLOGY | Facility: CLINIC | Age: 55
End: 2025-06-19
Payer: COMMERCIAL

## 2025-06-19 NOTE — TELEPHONE ENCOUNTER
Patient confirmed scheduled appointment:  Date: 9/10/25   Time: 12:30  Visit type: Return Seizure  Provider: Dr. Gipson  [place] [facility] [place]  Testing/imaging: NA  Additional notes: Reschedule